# Patient Record
Sex: FEMALE | Race: WHITE | Employment: FULL TIME | URBAN - METROPOLITAN AREA
[De-identification: names, ages, dates, MRNs, and addresses within clinical notes are randomized per-mention and may not be internally consistent; named-entity substitution may affect disease eponyms.]

---

## 2018-11-28 ENCOUNTER — HOSPITAL ENCOUNTER (OUTPATIENT)
Age: 33
Discharge: HOME | DRG: 192 | End: 2018-11-28
Payer: COMMERCIAL

## 2018-11-28 ENCOUNTER — HOSPITAL ENCOUNTER (OUTPATIENT)
Dept: GENERAL RADIOLOGY | Age: 33
Discharge: HOME | DRG: 192 | End: 2018-11-28
Payer: COMMERCIAL

## 2018-11-28 DIAGNOSIS — R06.02 BREATH SHORTNESS: ICD-10-CM

## 2018-11-28 PROCEDURE — 71046 X-RAY EXAM CHEST 2 VIEWS: CPT

## 2018-11-30 ENCOUNTER — OFFICE VISIT (OUTPATIENT)
Dept: CARDIOLOGY CLINIC | Age: 33
End: 2018-11-30
Payer: COMMERCIAL

## 2018-11-30 ENCOUNTER — HOSPITAL ENCOUNTER (OUTPATIENT)
Dept: CARDIOLOGY | Age: 33
Discharge: HOME | DRG: 192 | End: 2018-11-30
Payer: COMMERCIAL

## 2018-11-30 ENCOUNTER — APPOINTMENT (OUTPATIENT)
Dept: GENERAL RADIOLOGY | Age: 33
DRG: 192 | End: 2018-11-30
Attending: INTERNAL MEDICINE
Payer: COMMERCIAL

## 2018-11-30 ENCOUNTER — HOSPITAL ENCOUNTER (INPATIENT)
Age: 33
LOS: 5 days | Discharge: HOME | DRG: 192 | End: 2018-12-05
Attending: INTERNAL MEDICINE | Admitting: FAMILY MEDICINE
Payer: COMMERCIAL

## 2018-11-30 ENCOUNTER — HOSPITAL ENCOUNTER (OUTPATIENT)
Age: 33
Discharge: HOME | DRG: 192 | End: 2018-11-30
Payer: COMMERCIAL

## 2018-11-30 VITALS
BODY MASS INDEX: 23.5 KG/M2 | DIASTOLIC BLOOD PRESSURE: 70 MMHG | WEIGHT: 132.6 LBS | HEART RATE: 123 BPM | SYSTOLIC BLOOD PRESSURE: 90 MMHG | OXYGEN SATURATION: 94 % | HEIGHT: 63 IN

## 2018-11-30 DIAGNOSIS — R06.02 SHORTNESS OF BREATH: ICD-10-CM

## 2018-11-30 DIAGNOSIS — R00.0 TACHYCARDIA: ICD-10-CM

## 2018-11-30 DIAGNOSIS — R93.89 ABNORMAL CHEST X-RAY: ICD-10-CM

## 2018-11-30 DIAGNOSIS — R93.89 ABNORMAL CHEST X-RAY: Primary | ICD-10-CM

## 2018-11-30 PROBLEM — I50.9 CONGESTIVE HEART FAILURE OF UNKNOWN ETIOLOGY (HCC): Status: ACTIVE | Noted: 2018-11-30

## 2018-11-30 LAB
A/G RATIO: 1.1 (ref 1.1–2.2)
ALBUMIN SERPL-MCNC: 2.6 G/DL (ref 3.4–5)
ALBUMIN SERPL-MCNC: 3.2 G/DL (ref 3.4–5)
ALP BLD-CCNC: 77 U/L (ref 40–129)
ALP BLD-CCNC: 87 U/L (ref 40–129)
ALT SERPL-CCNC: 181 U/L (ref 10–40)
ALT SERPL-CCNC: 201 U/L (ref 10–40)
AMPHETAMINE SCREEN, URINE: ABNORMAL
ANION GAP SERPL CALCULATED.3IONS-SCNC: 11 MMOL/L (ref 3–16)
ANION GAP SERPL CALCULATED.3IONS-SCNC: 14 MMOL/L (ref 3–16)
AST SERPL-CCNC: 114 U/L (ref 15–37)
AST SERPL-CCNC: 89 U/L (ref 15–37)
BARBITURATE SCREEN URINE: ABNORMAL
BASOPHILS ABSOLUTE: 0.1 K/UL (ref 0–0.2)
BASOPHILS ABSOLUTE: 0.1 K/UL (ref 0–0.2)
BASOPHILS RELATIVE PERCENT: 0.5 %
BASOPHILS RELATIVE PERCENT: 1 %
BENZODIAZEPINE SCREEN, URINE: ABNORMAL
BILIRUB SERPL-MCNC: 0.5 MG/DL (ref 0–1)
BILIRUB SERPL-MCNC: 0.5 MG/DL (ref 0–1)
BILIRUBIN DIRECT: <0.2 MG/DL (ref 0–0.3)
BILIRUBIN, INDIRECT: ABNORMAL MG/DL (ref 0–1)
BUN BLDV-MCNC: 10 MG/DL (ref 7–20)
BUN BLDV-MCNC: 9 MG/DL (ref 7–20)
C-REACTIVE PROTEIN: 17.7 MG/L (ref 0–5.1)
CALCIUM SERPL-MCNC: 8.5 MG/DL (ref 8.3–10.6)
CALCIUM SERPL-MCNC: 8.8 MG/DL (ref 8.3–10.6)
CANNABINOID SCREEN URINE: ABNORMAL
CHLORIDE BLD-SCNC: 102 MMOL/L (ref 99–110)
CHLORIDE BLD-SCNC: 107 MMOL/L (ref 99–110)
CO2: 20 MMOL/L (ref 21–32)
CO2: 21 MMOL/L (ref 21–32)
COCAINE METABOLITE SCREEN URINE: ABNORMAL
CREAT SERPL-MCNC: 0.7 MG/DL (ref 0.6–1.1)
CREAT SERPL-MCNC: 0.8 MG/DL (ref 0.6–1.1)
EKG ATRIAL RATE: 108 BPM
EKG DIAGNOSIS: NORMAL
EKG P AXIS: 76 DEGREES
EKG P-R INTERVAL: 136 MS
EKG Q-T INTERVAL: 368 MS
EKG QRS DURATION: 90 MS
EKG QTC CALCULATION (BAZETT): 493 MS
EKG R AXIS: 86 DEGREES
EKG T AXIS: 179 DEGREES
EKG VENTRICULAR RATE: 108 BPM
EOSINOPHILS ABSOLUTE: 0 K/UL (ref 0–0.6)
EOSINOPHILS ABSOLUTE: 0 K/UL (ref 0–0.6)
EOSINOPHILS RELATIVE PERCENT: 0.2 %
EOSINOPHILS RELATIVE PERCENT: 0.4 %
FERRITIN: 39.3 NG/ML (ref 15–150)
GFR AFRICAN AMERICAN: >60
GFR AFRICAN AMERICAN: >60
GFR NON-AFRICAN AMERICAN: >60
GFR NON-AFRICAN AMERICAN: >60
GLOBULIN: 3 G/DL
GLUCOSE BLD-MCNC: 116 MG/DL (ref 70–99)
GLUCOSE BLD-MCNC: 120 MG/DL (ref 70–99)
HCG(URINE) PREGNANCY TEST: NEGATIVE
HCT VFR BLD CALC: 44.1 % (ref 36–48)
HCT VFR BLD CALC: 45.3 % (ref 36–48)
HEMOGLOBIN: 14.5 G/DL (ref 12–16)
HEMOGLOBIN: 15.1 G/DL (ref 12–16)
IRON SATURATION: 11 % (ref 15–50)
IRON: 51 UG/DL (ref 37–145)
LV EF: 18 %
LVEF MODALITY: NORMAL
LYMPHOCYTES ABSOLUTE: 2.4 K/UL (ref 1–5.1)
LYMPHOCYTES ABSOLUTE: 2.8 K/UL (ref 1–5.1)
LYMPHOCYTES RELATIVE PERCENT: 23.1 %
LYMPHOCYTES RELATIVE PERCENT: 24.8 %
Lab: ABNORMAL
MAGNESIUM: 2 MG/DL (ref 1.8–2.4)
MCH RBC QN AUTO: 32.1 PG (ref 26–34)
MCH RBC QN AUTO: 32.5 PG (ref 26–34)
MCHC RBC AUTO-ENTMCNC: 32.8 G/DL (ref 31–36)
MCHC RBC AUTO-ENTMCNC: 33.4 G/DL (ref 31–36)
MCV RBC AUTO: 96.2 FL (ref 80–100)
MCV RBC AUTO: 98.9 FL (ref 80–100)
METHADONE SCREEN, URINE: POSITIVE
MONOCYTES ABSOLUTE: 0.6 K/UL (ref 0–1.3)
MONOCYTES ABSOLUTE: 0.8 K/UL (ref 0–1.3)
MONOCYTES RELATIVE PERCENT: 5.4 %
MONOCYTES RELATIVE PERCENT: 7.3 %
NEUTROPHILS ABSOLUTE: 7.2 K/UL (ref 1.7–7.7)
NEUTROPHILS ABSOLUTE: 7.7 K/UL (ref 1.7–7.7)
NEUTROPHILS RELATIVE PERCENT: 68.2 %
NEUTROPHILS RELATIVE PERCENT: 69.1 %
OPIATE SCREEN URINE: ABNORMAL
OXYCODONE URINE: ABNORMAL
PDW BLD-RTO: 12.8 % (ref 12.4–15.4)
PDW BLD-RTO: 12.8 % (ref 12.4–15.4)
PH UA: 5
PHENCYCLIDINE SCREEN URINE: ABNORMAL
PLATELET # BLD: 396 K/UL (ref 135–450)
PLATELET # BLD: 403 K/UL (ref 135–450)
PMV BLD AUTO: 8.2 FL (ref 5–10.5)
PMV BLD AUTO: 8.8 FL (ref 5–10.5)
POTASSIUM SERPL-SCNC: 4.4 MMOL/L (ref 3.5–5.1)
POTASSIUM SERPL-SCNC: 4.8 MMOL/L (ref 3.5–5.1)
PRO-BNP: 6438 PG/ML (ref 0–124)
PROPOXYPHENE SCREEN: ABNORMAL
RBC # BLD: 4.46 M/UL (ref 4–5.2)
RBC # BLD: 4.71 M/UL (ref 4–5.2)
SEDIMENTATION RATE, ERYTHROCYTE: 10 MM/HR (ref 0–20)
SODIUM BLD-SCNC: 134 MMOL/L (ref 136–145)
SODIUM BLD-SCNC: 141 MMOL/L (ref 136–145)
TOTAL CK: 56 U/L (ref 26–192)
TOTAL IRON BINDING CAPACITY: 453 UG/DL (ref 260–445)
TOTAL PROTEIN: 5.7 G/DL (ref 6.4–8.2)
TOTAL PROTEIN: 6.2 G/DL (ref 6.4–8.2)
TRICYCLIC, URINE: ABNORMAL
TROPONIN: <0.01 NG/ML
TSH REFLEX FT4: 2.79 UIU/ML (ref 0.27–4.2)
TSH REFLEX FT4: 3.24 UIU/ML (ref 0.27–4.2)
TSH REFLEX: 3.33 UIU/ML (ref 0.27–4.2)
WBC # BLD: 10.5 K/UL (ref 4–11)
WBC # BLD: 11.2 K/UL (ref 4–11)

## 2018-11-30 PROCEDURE — 84156 ASSAY OF PROTEIN URINE: CPT

## 2018-11-30 PROCEDURE — 93005 ELECTROCARDIOGRAM TRACING: CPT | Performed by: INTERNAL MEDICINE

## 2018-11-30 PROCEDURE — 85025 COMPLETE CBC W/AUTO DIFF WBC: CPT

## 2018-11-30 PROCEDURE — G8427 DOCREV CUR MEDS BY ELIG CLIN: HCPCS | Performed by: INTERNAL MEDICINE

## 2018-11-30 PROCEDURE — 86645 CMV ANTIBODY IGM: CPT

## 2018-11-30 PROCEDURE — 82550 ASSAY OF CK (CPK): CPT

## 2018-11-30 PROCEDURE — 87390 HIV-1 AG IA: CPT

## 2018-11-30 PROCEDURE — 83550 IRON BINDING TEST: CPT

## 2018-11-30 PROCEDURE — 84443 ASSAY THYROID STIM HORMONE: CPT

## 2018-11-30 PROCEDURE — 99244 OFF/OP CNSLTJ NEW/EST MOD 40: CPT | Performed by: INTERNAL MEDICINE

## 2018-11-30 PROCEDURE — 80307 DRUG TEST PRSMV CHEM ANLYZR: CPT

## 2018-11-30 PROCEDURE — G8420 CALC BMI NORM PARAMETERS: HCPCS | Performed by: INTERNAL MEDICINE

## 2018-11-30 PROCEDURE — G8484 FLU IMMUNIZE NO ADMIN: HCPCS | Performed by: INTERNAL MEDICINE

## 2018-11-30 PROCEDURE — 86701 HIV-1ANTIBODY: CPT

## 2018-11-30 PROCEDURE — 84484 ASSAY OF TROPONIN QUANT: CPT

## 2018-11-30 PROCEDURE — 86335 IMMUNFIX E-PHORSIS/URINE/CSF: CPT

## 2018-11-30 PROCEDURE — 86140 C-REACTIVE PROTEIN: CPT

## 2018-11-30 PROCEDURE — 6360000002 HC RX W HCPCS: Performed by: INTERNAL MEDICINE

## 2018-11-30 PROCEDURE — 80053 COMPREHEN METABOLIC PANEL: CPT

## 2018-11-30 PROCEDURE — 93000 ELECTROCARDIOGRAM COMPLETE: CPT | Performed by: INTERNAL MEDICINE

## 2018-11-30 PROCEDURE — 84703 CHORIONIC GONADOTROPIN ASSAY: CPT

## 2018-11-30 PROCEDURE — 86702 HIV-2 ANTIBODY: CPT

## 2018-11-30 PROCEDURE — 82728 ASSAY OF FERRITIN: CPT

## 2018-11-30 PROCEDURE — 6370000000 HC RX 637 (ALT 250 FOR IP): Performed by: INTERNAL MEDICINE

## 2018-11-30 PROCEDURE — 83880 ASSAY OF NATRIURETIC PEPTIDE: CPT

## 2018-11-30 PROCEDURE — 1200000000 HC SEMI PRIVATE

## 2018-11-30 PROCEDURE — 2580000003 HC RX 258: Performed by: INTERNAL MEDICINE

## 2018-11-30 PROCEDURE — 36415 COLL VENOUS BLD VENIPUNCTURE: CPT

## 2018-11-30 PROCEDURE — 93306 TTE W/DOPPLER COMPLETE: CPT

## 2018-11-30 PROCEDURE — 83540 ASSAY OF IRON: CPT

## 2018-11-30 PROCEDURE — 86038 ANTINUCLEAR ANTIBODIES: CPT

## 2018-11-30 PROCEDURE — 84166 PROTEIN E-PHORESIS/URINE/CSF: CPT

## 2018-11-30 PROCEDURE — 6370000000 HC RX 637 (ALT 250 FOR IP): Performed by: STUDENT IN AN ORGANIZED HEALTH CARE EDUCATION/TRAINING PROGRAM

## 2018-11-30 PROCEDURE — 85652 RBC SED RATE AUTOMATED: CPT

## 2018-11-30 PROCEDURE — 83735 ASSAY OF MAGNESIUM: CPT

## 2018-11-30 PROCEDURE — 71045 X-RAY EXAM CHEST 1 VIEW: CPT

## 2018-11-30 RX ORDER — AMOXICILLIN AND CLAVULANATE POTASSIUM 875; 125 MG/1; MG/1
1 TABLET, FILM COATED ORAL EVERY 12 HOURS SCHEDULED
Status: COMPLETED | OUTPATIENT
Start: 2018-11-30 | End: 2018-12-03

## 2018-11-30 RX ORDER — DIGOXIN 125 MCG
125 TABLET ORAL DAILY
Status: DISCONTINUED | OUTPATIENT
Start: 2018-11-30 | End: 2018-12-05 | Stop reason: HOSPADM

## 2018-11-30 RX ORDER — SODIUM CHLORIDE 0.9 % (FLUSH) 0.9 %
10 SYRINGE (ML) INJECTION EVERY 12 HOURS SCHEDULED
Status: DISCONTINUED | OUTPATIENT
Start: 2018-11-30 | End: 2018-12-05 | Stop reason: HOSPADM

## 2018-11-30 RX ORDER — FUROSEMIDE 40 MG/1
20 TABLET ORAL DAILY
Status: DISCONTINUED | OUTPATIENT
Start: 2018-11-30 | End: 2018-12-05 | Stop reason: HOSPADM

## 2018-11-30 RX ORDER — SPIRONOLACTONE 25 MG/1
25 TABLET ORAL DAILY
Status: DISCONTINUED | OUTPATIENT
Start: 2018-11-30 | End: 2018-12-05 | Stop reason: HOSPADM

## 2018-11-30 RX ORDER — LISINOPRIL 5 MG/1
5 TABLET ORAL DAILY
Status: DISCONTINUED | OUTPATIENT
Start: 2018-11-30 | End: 2018-12-05 | Stop reason: HOSPADM

## 2018-11-30 RX ORDER — ACETAMINOPHEN 500 MG
1000 TABLET ORAL EVERY 6 HOURS PRN
Status: DISCONTINUED | OUTPATIENT
Start: 2018-11-30 | End: 2018-12-05 | Stop reason: HOSPADM

## 2018-11-30 RX ORDER — AMOXICILLIN AND CLAVULANATE POTASSIUM 875; 125 MG/1; MG/1
1 TABLET, FILM COATED ORAL 2 TIMES DAILY
Status: ON HOLD | COMMUNITY
End: 2018-12-05 | Stop reason: HOSPADM

## 2018-11-30 RX ORDER — METHADONE HYDROCHLORIDE 10 MG/1
30 TABLET ORAL DAILY
Status: DISCONTINUED | OUTPATIENT
Start: 2018-12-01 | End: 2018-12-05 | Stop reason: HOSPADM

## 2018-11-30 RX ORDER — SODIUM CHLORIDE 0.9 % (FLUSH) 0.9 %
10 SYRINGE (ML) INJECTION PRN
Status: DISCONTINUED | OUTPATIENT
Start: 2018-11-30 | End: 2018-12-05 | Stop reason: HOSPADM

## 2018-11-30 RX ORDER — METOPROLOL SUCCINATE 25 MG/1
25 TABLET, EXTENDED RELEASE ORAL DAILY
Status: DISCONTINUED | OUTPATIENT
Start: 2018-12-01 | End: 2018-12-05 | Stop reason: HOSPADM

## 2018-11-30 RX ORDER — METOPROLOL SUCCINATE 25 MG/1
25 TABLET, EXTENDED RELEASE ORAL DAILY
Qty: 30 TABLET | Refills: 3 | Status: ON HOLD | OUTPATIENT
Start: 2018-11-30 | End: 2018-12-05 | Stop reason: HOSPADM

## 2018-11-30 RX ADMIN — ENOXAPARIN SODIUM 40 MG: 40 INJECTION SUBCUTANEOUS at 16:07

## 2018-11-30 RX ADMIN — LISINOPRIL 5 MG: 5 TABLET ORAL at 16:05

## 2018-11-30 RX ADMIN — FUROSEMIDE 20 MG: 40 TABLET ORAL at 16:05

## 2018-11-30 RX ADMIN — DIGOXIN 125 MCG: 125 TABLET ORAL at 16:05

## 2018-11-30 RX ADMIN — SPIRONOLACTONE 25 MG: 25 TABLET ORAL at 16:04

## 2018-11-30 RX ADMIN — ACETAMINOPHEN 1000 MG: 500 TABLET ORAL at 19:20

## 2018-11-30 RX ADMIN — Medication 10 ML: at 20:20

## 2018-11-30 RX ADMIN — AMOXICILLIN AND CLAVULANATE POTASSIUM 1 TABLET: 875; 125 TABLET, FILM COATED ORAL at 20:20

## 2018-11-30 ASSESSMENT — PAIN SCALES - GENERAL
PAINLEVEL_OUTOF10: 2
PAINLEVEL_OUTOF10: 0

## 2018-11-30 ASSESSMENT — PAIN DESCRIPTION - PAIN TYPE: TYPE: ACUTE PAIN

## 2018-11-30 ASSESSMENT — PAIN DESCRIPTION - LOCATION: LOCATION: MOUTH

## 2018-11-30 NOTE — PLAN OF CARE
Problem: HEMODYNAMIC STATUS  Goal: Patient has stable vital signs and fluid balance  Outcome: Ongoing  Patient's EF (Ejection Fraction) is less than 40% (15-20% from Echo 11/30/2018)    Patient has a past medical history of Anxiety; Bipolar 2 disorder ([facility] Congestive heart failure of unknown etiology ([facility] and Substance abuse ([facility] [facility] Comorbidities reviewed and education provided. Patient and family's stated goal of care: be more comfortable prior to discharge    Patient's current functional capacity:  Marked limitation of physical activity. Comfortable at rest. Less than ordinary activity causes fatigue, palpitation, or dyspnea. Pt resting in bed at this time on room air. Pt denies shortness of breath. Pt without lower extremity edema. Patient's weights and intake/output reviewed:    Patient Vitals for the past 96 hrs (Last 3 readings):   Weight   11/30/18 1321 131 lb 12.8 oz (59.8 kg)     No intake or output data in the 24 hours ending 11/30/18 1355    Patient provided with education on CHF signs/symptoms, causes, discharge medications, daily weights, low sodium diet, activity, and follow-up. Notified patient to call the doctor post discharge if patient experiences shortness of breath, chest pain, swelling, cough, or weight gain of three pounds in a day/five pounds in a week. Also notified patient to call the doctor with dizziness, increased fatigue, decreased or difficulty urinating. Pt verbalized understanding. No additional questions at this time.     Education Time: 10 Minutes

## 2018-11-30 NOTE — CARE COORDINATION
Spoke with patient and mother at bedside. She lives in a house with her mother and father. She is independent at home. She has been clean and sober for about 10 years according to her mother. Will continue to follow and assess for any potential needs.

## 2018-11-30 NOTE — PROGRESS NOTES
Echocardiogram to evaluate heart function and structure. 2. Start Torpol XL 25 mg once daily for heart rate control. 3. Finish your antibiotics. 4. Check TSH, Free T4, CBC and CMP non fasting. trop  5. Follow up with me 4 weeks.      [address] Nanine Closs, MD 11/30/2018 8:18 AM

## 2018-11-30 NOTE — FLOWSHEET NOTE
11/30/18 1418   Encounter Summary   Services provided to: Patient   Referral/Consult From: Nurse   Support System Parent   Continue Visiting (11-30, initial, just got bad news about heart.)   Complexity of Encounter Moderate   Length of Encounter 15 minutes   Spiritual Assessment Completed Yes   Routine   Type Initial   Assessment Calm; Approachable   Intervention Active listening;Explored feelings, thoughts, concerns;Nurtured hope;Discussed illness/injury and it's impact   Outcome Engaged in conversation;Receptive    visit to assess needs and offer support. Patient this is all new to her. Her mother is supportive. Offered ongoing prn follow-up for support.

## 2018-11-30 NOTE — CONSULTS
11/30/2018    CREATININE 0.7 11/30/2018    GFRAA >60 11/30/2018    GFRAA >60 05/12/2010    LABGLOM >60 11/30/2018    GLUCOSE 116 11/30/2018    PROT 5.7 11/30/2018    PROT 7.5 05/12/2010    CALCIUM 8.5 11/30/2018    BILITOT 0.5 11/30/2018    ALKPHOS 77 11/30/2018    AST 89 11/30/2018     11/30/2018     PT/INR:  No results found for: PTINR  Lab Results   Component Value Date    TROPONINI <0.01 11/30/2018       EKG:  I have reviewed EKG with the following interpretation:  Impression: 11.30.18 sinus tachycardia anterolateral T wave inversion. Assessment  Acute systolic CHF  Underlying cardiomyopathy type undetermined rule out myocarditis    Patient Active Problem List   Diagnosis    Substance abuse ([facility]    Bipolar 2 disorder ([facility]    Anxiety    Abnormal chest x-ray    Shortness of breath    Tachycardia    Congestive heart failure of unknown etiology ([facility] [facility]         Plan:    I had the opportunity to review the clinical symptoms and presentation of Cortney Jurado. I recommend that the patient undergo work up for cardiomyopathy, rule out CAD less likely cause. BRENDON thyroid Iron deposition disease  She will undergo Rt and Lt heart cath on Monday  Urine light chains  Start low dose ace as tolerated   Toprol XL 25 mg daily  Lasix low dose  Digoxin  Aldactone   Low salt diet  Further treatment will depend on results of cath and response to above treatment    I will address the patient's cardiac risk factors and adjusted pharmacologic treatment as needed. In addition, I have reinforced the need for patient directed risk factor modification. Tobacco use was discussed with the patient and educated on the negative effects. I have asked the patient to not utilize these agents. Thank you for allowing to us to participate in the care or Cortney Jurado. Further evaluation will be based upon the patient's clinical course and testing results.     All questions and concerns were addressed to the

## 2018-11-30 NOTE — H&P
abuse admitted on 11/30 for CHF of unknown etiology. PLAN:    -- CHF of unknown etiology - Having exertional SOB and intermittent cough. Recent CXR on 11/28 suggestive of CHF. Echo today with EF of 16%. Troponin <0.01. Pro-BNP 6,438. Stable on RA. Asymptomatic currently. - Cardiology consulted. Appreciate their recommendations.    - TSH/T4 ordered    - HIV, CMV IgM ordered to rule out viral cardiomyopathy    - BRENDON ordered    - Will start Lisinopril 5mg daily    - Because of initial tachycardia, will hold off on beta blocker at this time. Received Toprol 25mg this AM.     -- Transaminitis - , ALT 89. Pt with remote hx of IV drug use. No abdominal pain or jaundice noted. - May consider ordering hepatitis panel tomorrow    -- Hx of polysubstance abuse - remote history of IV heroin use about 10 years ago. Pt states that she has not used IV since. Transaminitis noted , ALT 89 on admission.    - UDS pending   - Methadone 30mg daily ordered (starting tomorrow)    -- Recent tooth extraction - WBC 11.2 on admission.    - Continue Augmentin BID (On day 2 of 5)     DVT Prophylaxis: Lovenox    Diet: DIET LOW SODIUM 2 GM;    Code Status: Full Code    PT/OT Eval Status: N/A    Dispo - 2-3 days inpatient     This patient was seen and evaluated with attending, Dr. Kari Sauer. Mirta Mcneil D.O.    [place] [id number]. Service  PGY-1

## 2018-12-01 ENCOUNTER — APPOINTMENT (OUTPATIENT)
Dept: GENERAL RADIOLOGY | Age: 33
DRG: 192 | End: 2018-12-01
Attending: INTERNAL MEDICINE
Payer: COMMERCIAL

## 2018-12-01 LAB
ANION GAP SERPL CALCULATED.3IONS-SCNC: 12 MMOL/L (ref 3–16)
BASOPHILS ABSOLUTE: 0.1 K/UL (ref 0–0.2)
BASOPHILS RELATIVE PERCENT: 0.7 %
BUN BLDV-MCNC: 11 MG/DL (ref 7–20)
CALCIUM SERPL-MCNC: 8.3 MG/DL (ref 8.3–10.6)
CHLORIDE BLD-SCNC: 103 MMOL/L (ref 99–110)
CO2: 20 MMOL/L (ref 21–32)
CREAT SERPL-MCNC: 0.7 MG/DL (ref 0.6–1.1)
EOSINOPHILS ABSOLUTE: 0 K/UL (ref 0–0.6)
EOSINOPHILS RELATIVE PERCENT: 0.3 %
GFR AFRICAN AMERICAN: >60
GFR NON-AFRICAN AMERICAN: >60
GLUCOSE BLD-MCNC: 88 MG/DL (ref 70–99)
HAV IGM SER IA-ACNC: NORMAL
HCT VFR BLD CALC: 44.4 % (ref 36–48)
HEMOGLOBIN: 15.1 G/DL (ref 12–16)
HEPATITIS B CORE IGM ANTIBODY: NORMAL
HEPATITIS B SURFACE ANTIGEN INTERPRETATION: NORMAL
HEPATITIS C ANTIBODY INTERPRETATION: NORMAL
HIV AG/AB: NORMAL
HIV ANTIGEN: NORMAL
HIV-1 ANTIBODY: NORMAL
HIV-2 AB: NORMAL
LYMPHOCYTES ABSOLUTE: 3 K/UL (ref 1–5.1)
LYMPHOCYTES RELATIVE PERCENT: 33.1 %
MCH RBC QN AUTO: 32.3 PG (ref 26–34)
MCHC RBC AUTO-ENTMCNC: 33.9 G/DL (ref 31–36)
MCV RBC AUTO: 95.2 FL (ref 80–100)
MONOCYTES ABSOLUTE: 0.7 K/UL (ref 0–1.3)
MONOCYTES RELATIVE PERCENT: 7.5 %
NEUTROPHILS ABSOLUTE: 5.3 K/UL (ref 1.7–7.7)
NEUTROPHILS RELATIVE PERCENT: 58.4 %
PDW BLD-RTO: 12.6 % (ref 12.4–15.4)
PLATELET # BLD: 375 K/UL (ref 135–450)
PMV BLD AUTO: 9.4 FL (ref 5–10.5)
POTASSIUM SERPL-SCNC: 4.3 MMOL/L (ref 3.5–5.1)
PROTEIN PROTEIN: 0.01 G/DL
PROTEIN PROTEIN: 10 MG/DL
RBC # BLD: 4.66 M/UL (ref 4–5.2)
SODIUM BLD-SCNC: 135 MMOL/L (ref 136–145)
WBC # BLD: 9 K/UL (ref 4–11)

## 2018-12-01 PROCEDURE — 99233 SBSQ HOSP IP/OBS HIGH 50: CPT | Performed by: NURSE PRACTITIONER

## 2018-12-01 PROCEDURE — 94761 N-INVAS EAR/PLS OXIMETRY MLT: CPT

## 2018-12-01 PROCEDURE — 85025 COMPLETE CBC W/AUTO DIFF WBC: CPT

## 2018-12-01 PROCEDURE — 6370000000 HC RX 637 (ALT 250 FOR IP): Performed by: INTERNAL MEDICINE

## 2018-12-01 PROCEDURE — 80074 ACUTE HEPATITIS PANEL: CPT

## 2018-12-01 PROCEDURE — 36415 COLL VENOUS BLD VENIPUNCTURE: CPT

## 2018-12-01 PROCEDURE — 80048 BASIC METABOLIC PNL TOTAL CA: CPT

## 2018-12-01 PROCEDURE — 71045 X-RAY EXAM CHEST 1 VIEW: CPT

## 2018-12-01 PROCEDURE — 1200000000 HC SEMI PRIVATE

## 2018-12-01 PROCEDURE — 2580000003 HC RX 258: Performed by: INTERNAL MEDICINE

## 2018-12-01 PROCEDURE — 6370000000 HC RX 637 (ALT 250 FOR IP): Performed by: STUDENT IN AN ORGANIZED HEALTH CARE EDUCATION/TRAINING PROGRAM

## 2018-12-01 PROCEDURE — 2700000000 HC OXYGEN THERAPY PER DAY

## 2018-12-01 RX ADMIN — FUROSEMIDE 20 MG: 40 TABLET ORAL at 08:56

## 2018-12-01 RX ADMIN — AMOXICILLIN AND CLAVULANATE POTASSIUM 1 TABLET: 875; 125 TABLET, FILM COATED ORAL at 21:37

## 2018-12-01 RX ADMIN — Medication 10 ML: at 08:58

## 2018-12-01 RX ADMIN — Medication 10 ML: at 21:37

## 2018-12-01 RX ADMIN — SPIRONOLACTONE 25 MG: 25 TABLET ORAL at 08:55

## 2018-12-01 RX ADMIN — METOPROLOL SUCCINATE 25 MG: 25 TABLET, EXTENDED RELEASE ORAL at 08:56

## 2018-12-01 RX ADMIN — AMOXICILLIN AND CLAVULANATE POTASSIUM 1 TABLET: 875; 125 TABLET, FILM COATED ORAL at 08:55

## 2018-12-01 RX ADMIN — ACETAMINOPHEN 1000 MG: 500 TABLET ORAL at 16:32

## 2018-12-01 RX ADMIN — METHADONE HYDROCHLORIDE 30 MG: 10 TABLET ORAL at 08:55

## 2018-12-01 RX ADMIN — LISINOPRIL 5 MG: 5 TABLET ORAL at 08:55

## 2018-12-01 RX ADMIN — ACETAMINOPHEN 1000 MG: 500 TABLET ORAL at 06:26

## 2018-12-01 RX ADMIN — DIGOXIN 125 MCG: 125 TABLET ORAL at 08:55

## 2018-12-01 RX ADMIN — ACETAMINOPHEN 1000 MG: 500 TABLET ORAL at 21:40

## 2018-12-01 ASSESSMENT — PAIN SCALES - GENERAL
PAINLEVEL_OUTOF10: 1
PAINLEVEL_OUTOF10: 4
PAINLEVEL_OUTOF10: 0
PAINLEVEL_OUTOF10: 4
PAINLEVEL_OUTOF10: 0
PAINLEVEL_OUTOF10: 4

## 2018-12-01 NOTE — PROGRESS NOTES
Inpatient Family Medicine Progress Note      PCP: Alverto Lugo MD    Date of Admission: 11/30/2018    Chief Complaint: SOB and cough    Subjective:     Pt was seen and examined by me today. She's doing well and looks well in normal exam. Denies Chest pain or SOB while staying in bed however, States that SOB gets worse when she starts walking. Patient does have a remote history of IV heroin use about 10 years ago. Was also using Percocet off the street about 4 years ago. Has been stable on Methadone 30mg daily through [facility]      Medications:  Reviewed    Infusion Medications   Scheduled Medications    sodium chloride flush  10 mL Intravenous 2 times per day    enoxaparin  40 mg Subcutaneous Daily    amoxicillin-clavulanate  1 tablet Oral 2 times per day    methadone  30 mg Oral Daily    lisinopril  5 mg Oral Daily    furosemide  20 mg Oral Daily    spironolactone  25 mg Oral Daily    digoxin  125 mcg Oral Daily    metoprolol succinate  25 mg Oral Daily     PRN Meds: sodium chloride flush, magnesium hydroxide, acetaminophen      Intake/Output Summary (Last 24 hours) at 12/01/18 1631  Last data filed at 12/01/18 1149   Gross per 24 hour   Intake              840 ml   Output             1525 ml   Net             -685 ml       Physical Exam Performed:    /65   Pulse 96   Temp 98 °F (36.7 °C) (Oral)   Resp 15   Ht 5' 3\" (1.6 m)   Wt 130 lb 12.8 oz (59.3 kg)   LMP 11/22/2018 (Exact Date)   SpO2 97%   Breastfeeding? No   BMI 23.17 kg/m²     General appearance:  No apparent distress, appears stated age and cooperative. HEENT:  Normal cephalic, atraumatic without obvious deformity. Pupils equal, round, and reactive to light. Extra ocular muscles intact. Conjunctivae/corneas clear. Neck: Supple, with full range of motion. No jugular venous distention. Trachea midline. Respiratory:  Normal respiratory effort.  Clear to auscultation, bilaterally without

## 2018-12-01 NOTE — PROGRESS NOTES
(PRINIVIL;ZESTRIL) tablet 5 mg  5 mg Oral Daily Gracia [place], DO   5 mg at 12/01/18 0855    furosemide (LASIX) tablet 20 mg  20 mg Oral Daily Natalie Libman, MD   20 mg at 12/01/18 0545    spironolactone (ALDACTONE) tablet 25 mg  25 mg Oral Daily Natalie Libman, MD   25 mg at 12/01/18 0855    digoxin (LANOXIN) tablet 125 mcg  125 mcg Oral Daily Natalie Libman, MD   125 mcg at 12/01/18 4127    metoprolol succinate (TOPROL XL) extended release tablet 25 mg  25 mg Oral Daily Natalie Libman, MD   25 mg at 12/01/18 3679    acetaminophen (TYLENOL) tablet 1,000 mg  1,000 mg Oral Q6H PRN Gracia Eagle, DO   1,000 mg at 12/01/18 1341       Objective:     Telemetry monitor: SR    Physical Exam:  Constitutional:  Comfortable, NAD  Skin:  Warm and dry; no rash or lesions  Neck:  Supple, no JVD  Heart:  Regular, normal apex, S1 and S2 normal  Lungs:  Clear; no wheezing/rhonchi/rales  Abdomen: soft, non tender, + bowel sounds  Extremities:  No edema or cyanosis  Neuro: alert and oriented, moves all extremities equally    Data  EKG 11/30/2018:   Sinus tach     Echo  11/30/2018: The left ventricular systolic function is severely reduced with an ejection fraction of 15-20%.   EF by Kay's method estimated at 16%.   Upper limits of normal left ventricle size.   Severe global hypokinesis. Paradoxical septal motion. No intracardiac thrombus.   Elevated left ventricular diastolic filling pressure.   The right ventricle is normal in size with decreased function.   The left atrium is moderately dilated.   The right atrium is mildly dilated.   Color flow demonstrates eccentric jet suggesting moderate mitral regurgitation.   Mild pulmonic regurgitation.   Moderate tricuspid regurgitation.   Systolic pulmonary artery pressure (SPAP) is normal and estimated at 29 mmHg   (right atrial pressure 8 mmHg).     Lab Review     Renal Profile: Lab Results   Component Value Date    CREATININE 0.7 12/01/2018    BUN 11 12/01/2018    NA 135 12/01/2018    K 4.3 12/01/2018     12/01/2018    CO2 20 12/01/2018     CBC:  Lab Results   Component Value Date    WBC 9.0 12/01/2018    RBC 4.66 12/01/2018    HGB 15.1 12/01/2018    HCT 44.4 12/01/2018    MCV 95.2 12/01/2018    RDW 12.6 12/01/2018     12/01/2018     BNP:  No results found for: BNP  Fasting Lipid Panel:  No results found for: CHOL, HDL, TRIG  Cardiac Enzymes:  CK/MbTroponin  Lab Results   Component Value Date    CKTOTAL 56 11/30/2018    TROPONINI <0.01 11/30/2018     PT/ INR No results found for: INR, PROTIME  PTT No results found for: PTT Lab Results   Component Value Date    MG 2.00 11/30/2018    No results found for: TSH    Assessment:  1. Cardiomyopathy: etiology unknown  2. Acute systolic CHF: improving   -admission weight 131 pounds, is 130 today    -I&O appears inaccurate  3. NSVT: 2 brief episodes noted on telemetry  4. Sinus tachycardia: improved  5. Mitral and tricuspid regurgitation: moderate on echo  6. History of IVDA: on methadone (no reported IVDA in 10 years)  7. ADHD: on Adderall  8. Recent tooth extraction: on Augmentin  9. Transaminitis    Plan:   1. Continue Toprol, lisinopril, digoxin, Lasix, and spironolactone  2. Will increase Toprol and lisinopril as BP and HR allow  3. Daily weight, strict I&O, fluid and sodium restriction  4. LHC (and possible RHC) planned for 12/3. NPO after MN before procedure. Orders are placed. 5. Echo for EF 90 days after medications are optimized. Will recommend ICD if EF is 35% or less. 6. BMP in am  7.  Multiple labs still pending per primary however HIV is negative     DANIEL Kay-CNP  [facility]  [phone]

## 2018-12-02 ENCOUNTER — APPOINTMENT (OUTPATIENT)
Dept: GENERAL RADIOLOGY | Age: 33
DRG: 192 | End: 2018-12-02
Attending: INTERNAL MEDICINE
Payer: COMMERCIAL

## 2018-12-02 LAB
ANION GAP SERPL CALCULATED.3IONS-SCNC: 10 MMOL/L (ref 3–16)
BASOPHILS ABSOLUTE: 0.1 K/UL (ref 0–0.2)
BASOPHILS RELATIVE PERCENT: 0.7 %
BUN BLDV-MCNC: 11 MG/DL (ref 7–20)
CALCIUM SERPL-MCNC: 7.9 MG/DL (ref 8.3–10.6)
CHLORIDE BLD-SCNC: 105 MMOL/L (ref 99–110)
CO2: 22 MMOL/L (ref 21–32)
CREAT SERPL-MCNC: 0.8 MG/DL (ref 0.6–1.1)
CYTOMEGALOVIRUS IGM ANTIBODY: <8 AU/ML
EOSINOPHILS ABSOLUTE: 0 K/UL (ref 0–0.6)
EOSINOPHILS RELATIVE PERCENT: 0.5 %
GFR AFRICAN AMERICAN: >60
GFR NON-AFRICAN AMERICAN: >60
GLUCOSE BLD-MCNC: 116 MG/DL (ref 70–99)
HCT VFR BLD CALC: 40 % (ref 36–48)
HEMOGLOBIN: 13.6 G/DL (ref 12–16)
LYMPHOCYTES ABSOLUTE: 2.7 K/UL (ref 1–5.1)
LYMPHOCYTES RELATIVE PERCENT: 38.2 %
MAGNESIUM: 1.8 MG/DL (ref 1.8–2.4)
MCH RBC QN AUTO: 32.3 PG (ref 26–34)
MCHC RBC AUTO-ENTMCNC: 34 G/DL (ref 31–36)
MCV RBC AUTO: 94.9 FL (ref 80–100)
MONOCYTES ABSOLUTE: 0.5 K/UL (ref 0–1.3)
MONOCYTES RELATIVE PERCENT: 7 %
NEUTROPHILS ABSOLUTE: 3.8 K/UL (ref 1.7–7.7)
NEUTROPHILS RELATIVE PERCENT: 53.6 %
PDW BLD-RTO: 12.4 % (ref 12.4–15.4)
PLATELET # BLD: 342 K/UL (ref 135–450)
PMV BLD AUTO: 7.9 FL (ref 5–10.5)
POTASSIUM SERPL-SCNC: 3.7 MMOL/L (ref 3.5–5.1)
RBC # BLD: 4.22 M/UL (ref 4–5.2)
SODIUM BLD-SCNC: 137 MMOL/L (ref 136–145)
WBC # BLD: 7.2 K/UL (ref 4–11)

## 2018-12-02 PROCEDURE — 1200000000 HC SEMI PRIVATE

## 2018-12-02 PROCEDURE — 85025 COMPLETE CBC W/AUTO DIFF WBC: CPT

## 2018-12-02 PROCEDURE — 6370000000 HC RX 637 (ALT 250 FOR IP): Performed by: STUDENT IN AN ORGANIZED HEALTH CARE EDUCATION/TRAINING PROGRAM

## 2018-12-02 PROCEDURE — 82164 ANGIOTENSIN I ENZYME TEST: CPT

## 2018-12-02 PROCEDURE — 6370000000 HC RX 637 (ALT 250 FOR IP): Performed by: INTERNAL MEDICINE

## 2018-12-02 PROCEDURE — 6360000002 HC RX W HCPCS: Performed by: INTERNAL MEDICINE

## 2018-12-02 PROCEDURE — 99233 SBSQ HOSP IP/OBS HIGH 50: CPT | Performed by: INTERNAL MEDICINE

## 2018-12-02 PROCEDURE — 80048 BASIC METABOLIC PNL TOTAL CA: CPT

## 2018-12-02 PROCEDURE — 83735 ASSAY OF MAGNESIUM: CPT

## 2018-12-02 PROCEDURE — 36415 COLL VENOUS BLD VENIPUNCTURE: CPT

## 2018-12-02 PROCEDURE — 71045 X-RAY EXAM CHEST 1 VIEW: CPT

## 2018-12-02 PROCEDURE — 2580000003 HC RX 258: Performed by: INTERNAL MEDICINE

## 2018-12-02 RX ORDER — MAGNESIUM SULFATE HEPTAHYDRATE 500 MG/ML
2 INJECTION, SOLUTION INTRAMUSCULAR; INTRAVENOUS ONCE
Status: DISCONTINUED | OUTPATIENT
Start: 2018-12-02 | End: 2018-12-02 | Stop reason: SDUPTHER

## 2018-12-02 RX ORDER — MAGNESIUM SULFATE IN WATER 40 MG/ML
2 INJECTION, SOLUTION INTRAVENOUS ONCE
Status: COMPLETED | OUTPATIENT
Start: 2018-12-02 | End: 2018-12-02

## 2018-12-02 RX ORDER — POTASSIUM CHLORIDE 20 MEQ/1
40 TABLET, EXTENDED RELEASE ORAL ONCE
Status: COMPLETED | OUTPATIENT
Start: 2018-12-02 | End: 2018-12-02

## 2018-12-02 RX ADMIN — AMOXICILLIN AND CLAVULANATE POTASSIUM 1 TABLET: 875; 125 TABLET, FILM COATED ORAL at 19:52

## 2018-12-02 RX ADMIN — Medication 10 ML: at 19:52

## 2018-12-02 RX ADMIN — AMOXICILLIN AND CLAVULANATE POTASSIUM 1 TABLET: 875; 125 TABLET, FILM COATED ORAL at 08:35

## 2018-12-02 RX ADMIN — MAGNESIUM SULFATE HEPTAHYDRATE 2 G: 40 INJECTION, SOLUTION INTRAVENOUS at 12:45

## 2018-12-02 RX ADMIN — ACETAMINOPHEN 1000 MG: 500 TABLET ORAL at 16:11

## 2018-12-02 RX ADMIN — Medication 10 ML: at 08:37

## 2018-12-02 RX ADMIN — METOPROLOL SUCCINATE 25 MG: 25 TABLET, EXTENDED RELEASE ORAL at 08:35

## 2018-12-02 RX ADMIN — DIGOXIN 125 MCG: 125 TABLET ORAL at 08:35

## 2018-12-02 RX ADMIN — SPIRONOLACTONE 25 MG: 25 TABLET ORAL at 08:35

## 2018-12-02 RX ADMIN — ACETAMINOPHEN 1000 MG: 500 TABLET ORAL at 08:36

## 2018-12-02 RX ADMIN — FUROSEMIDE 20 MG: 40 TABLET ORAL at 08:35

## 2018-12-02 RX ADMIN — ACETAMINOPHEN 1000 MG: 500 TABLET ORAL at 23:30

## 2018-12-02 RX ADMIN — LISINOPRIL 5 MG: 5 TABLET ORAL at 08:35

## 2018-12-02 RX ADMIN — POTASSIUM CHLORIDE 40 MEQ: 20 TABLET, EXTENDED RELEASE ORAL at 12:44

## 2018-12-02 RX ADMIN — METHADONE HYDROCHLORIDE 30 MG: 10 TABLET ORAL at 08:35

## 2018-12-02 ASSESSMENT — PAIN SCALES - GENERAL
PAINLEVEL_OUTOF10: 3
PAINLEVEL_OUTOF10: 4
PAINLEVEL_OUTOF10: 3
PAINLEVEL_OUTOF10: 4
PAINLEVEL_OUTOF10: 5

## 2018-12-02 ASSESSMENT — PAIN DESCRIPTION - PROGRESSION
CLINICAL_PROGRESSION: NOT CHANGED
CLINICAL_PROGRESSION: NOT CHANGED

## 2018-12-02 ASSESSMENT — PAIN DESCRIPTION - FREQUENCY
FREQUENCY: CONTINUOUS
FREQUENCY: CONTINUOUS

## 2018-12-02 ASSESSMENT — PAIN DESCRIPTION - PAIN TYPE
TYPE: ACUTE PAIN

## 2018-12-02 ASSESSMENT — PAIN DESCRIPTION - ORIENTATION
ORIENTATION: LEFT;UPPER
ORIENTATION: LEFT;UPPER

## 2018-12-02 ASSESSMENT — PAIN DESCRIPTION - DESCRIPTORS
DESCRIPTORS: ACHING;NAGGING
DESCRIPTORS: ACHING;NAGGING

## 2018-12-02 ASSESSMENT — PAIN DESCRIPTION - ONSET
ONSET: ON-GOING
ONSET: ON-GOING

## 2018-12-02 ASSESSMENT — PAIN DESCRIPTION - LOCATION
LOCATION: TEETH

## 2018-12-02 NOTE — PROGRESS NOTES
Inpatient Family Medicine Progress Note      PCP: Jb Gomez MD    Date of Admission: 11/30/2018    Chief Complaint: Cough, Dyspnea     Hospital Course: Patient was seen by cardiology on 11/30 after having 3-4 weeks of worsening dypnea. Echo was performed in the office which showed an EF of 16-20%, and was subsequently admitted to the hospital.  EKG showed ST changes suspicious for possible lateral ischemia. Subjective: Feeling much less short of breath overnight. Was able to walk to the bathroom and shower without becoming short of breath. Patient spoke to cardiology and is awaiting her right and left heart cath to be performed tomorrow. Medications:  Reviewed    Infusion Medications   Scheduled Medications    magnesium sulfate  2 g Intravenous Once    sodium chloride flush  10 mL Intravenous 2 times per day    enoxaparin  40 mg Subcutaneous Daily    amoxicillin-clavulanate  1 tablet Oral 2 times per day    methadone  30 mg Oral Daily    lisinopril  5 mg Oral Daily    furosemide  20 mg Oral Daily    spironolactone  25 mg Oral Daily    digoxin  125 mcg Oral Daily    metoprolol succinate  25 mg Oral Daily     PRN Meds: sodium chloride flush, magnesium hydroxide, acetaminophen      Intake/Output Summary (Last 24 hours) at 12/02/18 1353  Last data filed at 12/02/18 1204   Gross per 24 hour   Intake             1680 ml   Output              900 ml   Net              780 ml       Physical Exam Performed:    /64   Pulse 70   Temp 97.9 °F (36.6 °C) (Oral)   Resp 16   Ht 5' 3\" (1.6 m)   Wt 131 lb (59.4 kg)   LMP 11/22/2018 (Exact Date)   SpO2 96%   Breastfeeding? No   BMI 23.21 kg/m²     General appearance: No apparent distress, appears stated age and cooperative. HEENT: Pupils equal, round, and reactive to light. Conjunctivae/corneas clear. Neck: Supple, with full range of motion. No jugular venous distention. Trachea midline. Respiratory:  Normal respiratory effort.  Clear to

## 2018-12-03 ENCOUNTER — APPOINTMENT (OUTPATIENT)
Dept: CARDIAC CATH/INVASIVE PROCEDURES | Age: 33
DRG: 192 | End: 2018-12-03
Attending: INTERNAL MEDICINE
Payer: COMMERCIAL

## 2018-12-03 LAB
ANA INTERPRETATION: NORMAL
ANION GAP SERPL CALCULATED.3IONS-SCNC: 9 MMOL/L (ref 3–16)
ANTI-NUCLEAR ANTIBODY (ANA): NEGATIVE
BASOPHILS ABSOLUTE: 0.1 K/UL (ref 0–0.2)
BASOPHILS RELATIVE PERCENT: 0.8 %
BUN BLDV-MCNC: 9 MG/DL (ref 7–20)
CALCIUM SERPL-MCNC: 8.1 MG/DL (ref 8.3–10.6)
CHLORIDE BLD-SCNC: 107 MMOL/L (ref 99–110)
CO2: 24 MMOL/L (ref 21–32)
CREAT SERPL-MCNC: 0.7 MG/DL (ref 0.6–1.1)
EOSINOPHILS ABSOLUTE: 0.1 K/UL (ref 0–0.6)
EOSINOPHILS RELATIVE PERCENT: 0.8 %
GFR AFRICAN AMERICAN: >60
GFR NON-AFRICAN AMERICAN: >60
GLUCOSE BLD-MCNC: 91 MG/DL (ref 70–99)
HCT VFR BLD CALC: 40.3 % (ref 36–48)
HEMOGLOBIN: 13.6 G/DL (ref 12–16)
LYMPHOCYTES ABSOLUTE: 3 K/UL (ref 1–5.1)
LYMPHOCYTES RELATIVE PERCENT: 40.4 %
MCH RBC QN AUTO: 32.2 PG (ref 26–34)
MCHC RBC AUTO-ENTMCNC: 33.7 G/DL (ref 31–36)
MCV RBC AUTO: 95.7 FL (ref 80–100)
MONOCYTES ABSOLUTE: 0.6 K/UL (ref 0–1.3)
MONOCYTES RELATIVE PERCENT: 8 %
NEUTROPHILS ABSOLUTE: 3.8 K/UL (ref 1.7–7.7)
NEUTROPHILS RELATIVE PERCENT: 50 %
PDW BLD-RTO: 12.6 % (ref 12.4–15.4)
PLATELET # BLD: 302 K/UL (ref 135–450)
PMV BLD AUTO: 8.3 FL (ref 5–10.5)
POTASSIUM SERPL-SCNC: 4.6 MMOL/L (ref 3.5–5.1)
RBC # BLD: 4.21 M/UL (ref 4–5.2)
SODIUM BLD-SCNC: 140 MMOL/L (ref 136–145)
WBC # BLD: 7.5 K/UL (ref 4–11)

## 2018-12-03 PROCEDURE — 6370000000 HC RX 637 (ALT 250 FOR IP): Performed by: STUDENT IN AN ORGANIZED HEALTH CARE EDUCATION/TRAINING PROGRAM

## 2018-12-03 PROCEDURE — 4A023N8 MEASUREMENT OF CARDIAC SAMPLING AND PRESSURE, BILATERAL, PERCUTANEOUS APPROACH: ICD-10-PCS | Performed by: INTERNAL MEDICINE

## 2018-12-03 PROCEDURE — 2709999900 HC NON-CHARGEABLE SUPPLY

## 2018-12-03 PROCEDURE — 93460 R&L HRT ART/VENTRICLE ANGIO: CPT | Performed by: INTERNAL MEDICINE

## 2018-12-03 PROCEDURE — 2580000003 HC RX 258: Performed by: INTERNAL MEDICINE

## 2018-12-03 PROCEDURE — B2111ZZ FLUOROSCOPY OF MULTIPLE CORONARY ARTERIES USING LOW OSMOLAR CONTRAST: ICD-10-PCS | Performed by: INTERNAL MEDICINE

## 2018-12-03 PROCEDURE — C1769 GUIDE WIRE: HCPCS

## 2018-12-03 PROCEDURE — 36415 COLL VENOUS BLD VENIPUNCTURE: CPT

## 2018-12-03 PROCEDURE — 93566 NJX CAR CTH SLCTV RV/RA ANG: CPT | Performed by: INTERNAL MEDICINE

## 2018-12-03 PROCEDURE — 6370000000 HC RX 637 (ALT 250 FOR IP): Performed by: INTERNAL MEDICINE

## 2018-12-03 PROCEDURE — 80048 BASIC METABOLIC PNL TOTAL CA: CPT

## 2018-12-03 PROCEDURE — C1887 CATHETER, GUIDING: HCPCS

## 2018-12-03 PROCEDURE — 99024 POSTOP FOLLOW-UP VISIT: CPT | Performed by: INTERNAL MEDICINE

## 2018-12-03 PROCEDURE — 1200000000 HC SEMI PRIVATE

## 2018-12-03 PROCEDURE — 85025 COMPLETE CBC W/AUTO DIFF WBC: CPT

## 2018-12-03 PROCEDURE — 6360000004 HC RX CONTRAST MEDICATION: Performed by: INTERNAL MEDICINE

## 2018-12-03 PROCEDURE — C1894 INTRO/SHEATH, NON-LASER: HCPCS

## 2018-12-03 PROCEDURE — B2151ZZ FLUOROSCOPY OF LEFT HEART USING LOW OSMOLAR CONTRAST: ICD-10-PCS | Performed by: INTERNAL MEDICINE

## 2018-12-03 PROCEDURE — 2500000003 HC RX 250 WO HCPCS

## 2018-12-03 PROCEDURE — 2580000003 HC RX 258

## 2018-12-03 PROCEDURE — 6360000002 HC RX W HCPCS

## 2018-12-03 PROCEDURE — 99152 MOD SED SAME PHYS/QHP 5/>YRS: CPT | Performed by: INTERNAL MEDICINE

## 2018-12-03 PROCEDURE — 99153 MOD SED SAME PHYS/QHP EA: CPT | Performed by: INTERNAL MEDICINE

## 2018-12-03 RX ADMIN — AMOXICILLIN AND CLAVULANATE POTASSIUM 1 TABLET: 875; 125 TABLET, FILM COATED ORAL at 08:11

## 2018-12-03 RX ADMIN — Medication 10 ML: at 08:11

## 2018-12-03 RX ADMIN — METOPROLOL SUCCINATE 25 MG: 25 TABLET, EXTENDED RELEASE ORAL at 08:16

## 2018-12-03 RX ADMIN — METHADONE HYDROCHLORIDE 30 MG: 10 TABLET ORAL at 08:11

## 2018-12-03 RX ADMIN — Medication 10 ML: at 20:00

## 2018-12-03 RX ADMIN — ACETAMINOPHEN 1000 MG: 500 TABLET ORAL at 16:05

## 2018-12-03 RX ADMIN — IOVERSOL 55 ML: 741 INJECTION INTRA-ARTERIAL; INTRAVENOUS at 14:14

## 2018-12-03 ASSESSMENT — PAIN SCALES - GENERAL
PAINLEVEL_OUTOF10: 1
PAINLEVEL_OUTOF10: 0
PAINLEVEL_OUTOF10: 3

## 2018-12-03 NOTE — PLAN OF CARE
Problem: HEMODYNAMIC STATUS  Goal: Patient has stable vital signs and fluid balance    Intervention: ASSESS RATE AND RHYTHM OF APICAL PULSE  Patient's EF (Ejection Fraction) is less than 40%    Patient has a past medical history of Anxiety; Bipolar 2 disorder ([facility] [address] Congestive heart failure of unknown etiology ([facility] [address] and Sub[facility] abuse ([facility] Comorbidities reviewed and education provided. Patient and family's stated goal of care: increase activity tolerance prior to discharge    Patient's current functional capacity:  Marked limitation of physical activity. Comfortable at rest. Less than ordinary activity causes fatigue, palpitation, or dyspnea. Pt resting in bed at this time on room air. Pt denies shortness of breath. Pt without lower extremity edema. Patient's weights and intake/output reviewed:    Patient Vitals for the past 96 hrs (Last 3 readings):   Weight   12/02/18 0420 131 lb (59.4 kg)   12/01/18 0603 130 lb 12.8 oz (59.3 kg)   11/30/18 1321 131 lb 12.8 oz (59.8 kg)       Intake/Output Summary (Last 24 hours) at 12/03/18 0047  Last data filed at 12/02/18 2333   Gross per 24 hour   Intake             1220 ml   Output             2100 ml   Net             -880 ml       Patient provided with education on CHF signs/symptoms, causes, discharge medications, daily weights, low sodium diet, activity, and follow-up. Notified patient to call the doctor post discharge if patient experiences shortness of breath, chest pain, swelling, cough, or weight gain of three pounds in a day/five pounds in a week. Also notified patient to call the doctor with dizziness, increased fatigue, decreased or difficulty urinating. Pt verbalized understanding. No additional questions at this time.     Education Time: 10 Minutes

## 2018-12-03 NOTE — PROCEDURES
disease. Circ is a modest  size vessel. No disease. Right coronary artery is modest to large size  vessel. No disease. Manual pressure used for arterial hemostasis. CONSCIOUS SEDATION PHYSICIAN WORKSHEET    PROCEDURE START TIME:  13:38. PROCEDURE END TIME:  14:06. A 2 mg of Versed and 100 mcg of fentanyl given without complication. Total contrast 55 mL. CONCLUSION:  Nonischemic cardiomyopathy without evidence for ischemic  heart disease or intracardiac shunting.         Nelson Meckel, MD    D: 12/03/2018 14:38:30       T: 12/03/2018 15:44:48     [email or web address]  Job#: [id number]     Doc#: [id number]    CC:

## 2018-12-03 NOTE — PROGRESS NOTES
computerized transcription errors may be present.     Alicia Isaac MD, JAMEE, [facility], [address] [place] [address]  12/3/2018 1:34 PM

## 2018-12-03 NOTE — BRIEF OP NOTE
[address] [facility]                                           Cardiology Procedure Note - Brief                                                     - Full note dictated -     Patient: Rio Gilman  JMGO:[id number]  Performing Physician: Areli Storey MD JAMEE    Indications: Active Problems:    Congestive heart failure of unknown etiology ([address]  Resolved Problems:    * No resolved hospital problems. *      Procedure Performed:  1. Coronary angiogram  2. Left heart cath  3. Left ventriculogram  4. Right heart cath    Findings:  1. Normal Coronary Angiogram  2. Normal LVEF  3. Normal hemodynamics - left and right     Conclusion/plan of care:  1.  Non-ischemic CMP      Areli Storey MD, [address], [place], [place]  [phone] [facility]  [phone] [facility]  12/3/2018  2:14 PM

## 2018-12-03 NOTE — PRE SEDATION
magnesium hydroxide (MILK OF MAGNESIA) 400 MG/5ML suspension 30 mL  30 mL Oral Daily PRN Donovan Blake MD        enoxaparin (LOVENOX) injection 40 mg  40 mg Subcutaneous Daily Donovan Blake MD   40 mg at 11/30/18 1607    methadone (DOLOPHINE) tablet 30 mg  30 mg Oral Daily DorisNarda Rockwellen, DO   30 mg at 12/03/18 0811    lisinopril (PRINIVIL;ZESTRIL) tablet 5 mg  5 mg Oral Daily Junaid Sleet, DO   5 mg at 12/02/18 3509    furosemide (LASIX) tablet 20 mg  20 mg Oral Daily Jenna Lancaster MD   20 mg at 12/02/18 8078    spironolactone (ALDACTONE) tablet 25 mg  25 mg Oral Daily Jenna Lancaster MD   25 mg at 12/02/18 1274    digoxin (LANOXIN) tablet 125 mcg  125 mcg Oral Daily Jenna Lancaster MD   125 mcg at 12/02/18 5376    metoprolol succinate (TOPROL XL) extended release tablet 25 mg  25 mg Oral Daily Jenna Lancaster MD   25 mg at 12/03/18 7598    acetaminophen (TYLENOL) tablet 1,000 mg  1,000 mg Oral Q6H PRN Junaid Sleet, DO   1,000 mg at 12/02/18 2330           Pre-Sedation:    Pre-Sedation Documentation and Exam:  I have assessed the patient and agree with the H&P present on the chart. Prior History of Anesthesia Complications:   none    Modified Mallampati:  I (soft palate, uvula, fauces, tonsillar pillars visible)    ASA Classification:  Class 3 - A patient with severe systemic disease that limits activity but is not incapacitating    Katya Scale: Activity:  2 - Able to move 4 extremities voluntarily on command  Respiration:  2 - Able to breathe deeply and cough freely  Circulation:  2 - BP+/- 20mmHg of normal  Consciousness:  2 - Fully awake  Oxygen Saturation (color):  2 - Able to maintain oxygen saturation >92% on room air    Sedation/Anesthesia Plan:  Guard the patient's safety and welfare. Minimize physical discomfort and pain. Minimize negative psychological responses to treatment by providing sedation and analgesia and maximize the potential amnesia.   Patient to meet pre-procedure discharge plan.     Medication Planned:  midazolam intravenously, fentanyl intravenously    Patient is an appropriate candidate for plan of sedation: yes      Electronically signed by Trever Zheng MD on 12/3/2018 at 1:36 PM

## 2018-12-03 NOTE — CONSULTS
cups of fluid per day, including water and pop. States she takes all her home medications as prescribed. Patient has a scale at home. Patient denies concerns paying for medications. Patient recent weights and intake/output reviewed:    Patient Vitals for the past 96 hrs (Last 3 readings):   Weight   12/03/18 0509 129 lb 1.6 oz (58.6 kg)   12/02/18 0420 131 lb (59.4 kg)   12/01/18 0603 130 lb 12.8 oz (59.3 kg)         Intake/Output Summary (Last 24 hours) at 12/03/18 1558  Last data filed at 12/03/18 0509   Gross per 24 hour   Intake              740 ml   Output             1900 ml   Net            -1160 ml       Notified patient of scheduled hospital follow-up appointment with Tommy Blanc MD for 12/7 at 10:30 AM and advised pt cardiology will be scheduled prior to discharge. Notified patient to call the doctor post discharge if she experiences shortness of breath, chest pain, swelling, cough, or weight gain of 2-3 pounds in a day / 5 pounds in a week. Also notified patient to call the doctor if she feels dizzy, increased fatigue, decreased or difficulty urinating. Reviewed the red, yellow, green zones of heart failure self management. Encouraged patient to call the MD with early signs of an acute heart failure exacerbation or when in the yellow zone. Patient provided with both written and verbal education on CHF signs/symptoms, causes, discharge medications, daily weights, low sodium diet, activity, fluid restriction, and follow-up. Reviewed activity level recommendations with her EF of 15%. Pt instructed to avoid heavy lifting greater than 15 lb at this time. Emphasized importance of follow up appointments and taking Rx medications. Instructed on lisinopril, Toprol XL and mayte with pt. Pt denies use of illicit drugs to this writer. Pt did have recent dental work and flu vaccine and she wondered if that is the cause of her cardiomyopathy.  Pt offered CHF group classes and pt interested but will consider after she sees her cardiologist in outpatient follow up. Systolic CHF explained. Echo explained and will instructed it will be rechecked at future date of 2-3 months. Advised pt on CHF symptoms and signs of worsening of condition. Office phone number of [facility] given to pt. Pt verbalized understanding. No additional questions at this time. Stated she will alert her nurse with any questions. PATIENT/CAREGIVER TEACHING:    Level of patient/caregiver understanding able to:   [ x ] Verbalize understanding [ ] Demonstrate understanding [ ] Teach back   [ x ] Needs reinforcement [ ] Other:     Education Time: 30 minutes    Recommendations:   1. Encourage follow-up appointment compliance. Next appointment: 12/7 PCP and [facility] cardiology TBD  2. Educate further on fluid restriction 48 oz - 64 oz during inpatient stay so he can understand how to measure intake at home. 3. Review sodium restrictions. Encourage patient to not add table salt to her foods and avoid foods that are high in sodium. 4. Continue to educate on S/S. Stress the importance of calling the MD with the earliest signs of an acute exacerbation. 5. Emphasize daily weights - instruct patient to call the MD if she gains 2-3 lb in a day or 5 lb in a week. 6. Provided patient with CHF Resource Line for questions and concerns. 7. ? CHF group education classes  8. Importance of avoiding any toxins (tobacco, alcohol, illicit drugs) and advised to prevent pregnancy with her heart failure condition.      12/3/2018 3:58 PM

## 2018-12-04 PROBLEM — I47.29 NSVT (NONSUSTAINED VENTRICULAR TACHYCARDIA): Status: ACTIVE | Noted: 2018-12-04

## 2018-12-04 LAB
ANGIOTENSIN CONVERTING ENZYME: 11 U/L (ref 9–67)
ANION GAP SERPL CALCULATED.3IONS-SCNC: 10 MMOL/L (ref 3–16)
BASOPHILS ABSOLUTE: 0.1 K/UL (ref 0–0.2)
BASOPHILS RELATIVE PERCENT: 0.8 %
BUN BLDV-MCNC: 11 MG/DL (ref 7–20)
CALCIUM SERPL-MCNC: 9.1 MG/DL (ref 8.3–10.6)
CHLORIDE BLD-SCNC: 102 MMOL/L (ref 99–110)
CO2: 26 MMOL/L (ref 21–32)
CREAT SERPL-MCNC: 0.8 MG/DL (ref 0.6–1.1)
EKG ATRIAL RATE: 79 BPM
EKG DIAGNOSIS: NORMAL
EKG P AXIS: 65 DEGREES
EKG P-R INTERVAL: 142 MS
EKG Q-T INTERVAL: 388 MS
EKG QRS DURATION: 88 MS
EKG QTC CALCULATION (BAZETT): 444 MS
EKG R AXIS: 82 DEGREES
EKG T AXIS: 260 DEGREES
EKG VENTRICULAR RATE: 79 BPM
EOSINOPHILS ABSOLUTE: 0 K/UL (ref 0–0.6)
EOSINOPHILS RELATIVE PERCENT: 0.6 %
GFR AFRICAN AMERICAN: >60
GFR NON-AFRICAN AMERICAN: >60
GLUCOSE BLD-MCNC: 92 MG/DL (ref 70–99)
HCT VFR BLD CALC: 45 % (ref 36–48)
HEMOGLOBIN: 15.3 G/DL (ref 12–16)
LYMPHOCYTES ABSOLUTE: 2.5 K/UL (ref 1–5.1)
LYMPHOCYTES RELATIVE PERCENT: 36.1 %
MAGNESIUM: 2.1 MG/DL (ref 1.8–2.4)
MCH RBC QN AUTO: 32.5 PG (ref 26–34)
MCHC RBC AUTO-ENTMCNC: 34.1 G/DL (ref 31–36)
MCV RBC AUTO: 95.4 FL (ref 80–100)
MONOCYTES ABSOLUTE: 0.6 K/UL (ref 0–1.3)
MONOCYTES RELATIVE PERCENT: 8.6 %
NEUTROPHILS ABSOLUTE: 3.8 K/UL (ref 1.7–7.7)
NEUTROPHILS RELATIVE PERCENT: 53.9 %
PDW BLD-RTO: 12.3 % (ref 12.4–15.4)
PLATELET # BLD: 385 K/UL (ref 135–450)
PMV BLD AUTO: 8.2 FL (ref 5–10.5)
POTASSIUM SERPL-SCNC: 4.7 MMOL/L (ref 3.5–5.1)
PRO-BNP: 3929 PG/ML (ref 0–124)
RBC # BLD: 4.71 M/UL (ref 4–5.2)
SODIUM BLD-SCNC: 138 MMOL/L (ref 136–145)
WBC # BLD: 7 K/UL (ref 4–11)

## 2018-12-04 PROCEDURE — 99254 IP/OBS CNSLTJ NEW/EST MOD 60: CPT | Performed by: INTERNAL MEDICINE

## 2018-12-04 PROCEDURE — 80048 BASIC METABOLIC PNL TOTAL CA: CPT

## 2018-12-04 PROCEDURE — 93005 ELECTROCARDIOGRAM TRACING: CPT | Performed by: INTERNAL MEDICINE

## 2018-12-04 PROCEDURE — 83880 ASSAY OF NATRIURETIC PEPTIDE: CPT

## 2018-12-04 PROCEDURE — 6370000000 HC RX 637 (ALT 250 FOR IP): Performed by: INTERNAL MEDICINE

## 2018-12-04 PROCEDURE — 85025 COMPLETE CBC W/AUTO DIFF WBC: CPT

## 2018-12-04 PROCEDURE — 36415 COLL VENOUS BLD VENIPUNCTURE: CPT

## 2018-12-04 PROCEDURE — 2580000003 HC RX 258: Performed by: INTERNAL MEDICINE

## 2018-12-04 PROCEDURE — 6370000000 HC RX 637 (ALT 250 FOR IP): Performed by: STUDENT IN AN ORGANIZED HEALTH CARE EDUCATION/TRAINING PROGRAM

## 2018-12-04 PROCEDURE — 99223 1ST HOSP IP/OBS HIGH 75: CPT | Performed by: INTERNAL MEDICINE

## 2018-12-04 PROCEDURE — 1200000000 HC SEMI PRIVATE

## 2018-12-04 PROCEDURE — 83735 ASSAY OF MAGNESIUM: CPT

## 2018-12-04 RX ADMIN — METHADONE HYDROCHLORIDE 30 MG: 10 TABLET ORAL at 09:48

## 2018-12-04 RX ADMIN — METOPROLOL SUCCINATE 25 MG: 25 TABLET, EXTENDED RELEASE ORAL at 09:26

## 2018-12-04 RX ADMIN — ACETAMINOPHEN 1000 MG: 500 TABLET ORAL at 14:07

## 2018-12-04 RX ADMIN — DIGOXIN 125 MCG: 125 TABLET ORAL at 09:27

## 2018-12-04 RX ADMIN — ACETAMINOPHEN 1000 MG: 500 TABLET ORAL at 21:30

## 2018-12-04 RX ADMIN — FUROSEMIDE 20 MG: 40 TABLET ORAL at 09:26

## 2018-12-04 RX ADMIN — LISINOPRIL 5 MG: 5 TABLET ORAL at 09:26

## 2018-12-04 RX ADMIN — Medication 10 ML: at 20:12

## 2018-12-04 RX ADMIN — Medication 10 ML: at 09:26

## 2018-12-04 RX ADMIN — ACETAMINOPHEN 1000 MG: 500 TABLET ORAL at 04:36

## 2018-12-04 RX ADMIN — SPIRONOLACTONE 25 MG: 25 TABLET ORAL at 09:27

## 2018-12-04 ASSESSMENT — PAIN SCALES - GENERAL
PAINLEVEL_OUTOF10: 0
PAINLEVEL_OUTOF10: 2
PAINLEVEL_OUTOF10: 0
PAINLEVEL_OUTOF10: 4
PAINLEVEL_OUTOF10: 4
PAINLEVEL_OUTOF10: 3
PAINLEVEL_OUTOF10: 2

## 2018-12-04 NOTE — DISCHARGE SUMMARY
4.7 12/04/2018     12/04/2018    CO2 26 12/04/2018    BUN 11 12/04/2018    CREATININE 0.8 12/04/2018    CALCIUM 9.1 12/04/2018     Significant Diagnostic Studies    Radiology:   XR CHEST PORTABLE   Final Result   1. Right basilar infiltrate seen yesterday not well demonstrated today   2. Increased perihilar markings on the left with suspected mild left   subpleural edema. Correlate with any clinical findings of CHF or pneumonia         XR CHEST PORTABLE   Final Result   Right basilar atelectasis versus pneumonia, increased from yesterday's exam.         XR CHEST PORTABLE   Final Result   Mild CHF with left perihilar asymmetric edema versus pneumonia. Consults:     IP CONSULT TO CARDIOLOGY  IP CONSULT TO HEART FAILURE NURSE/COORDINATOR  IP CONSULT TO HEART FAILURE NURSE/COORDINATOR    Disposition:  Home     Condition at Discharge: Stable    Discharge Instructions/Follow-up:  Follow up with PCP on Friday 12/7/18. Follow up with CHF clinic. Follow up with electrophysiology for repeat echo in 3 months. Code Status:  Full Code    Activity: activity as tolerated    Diet: regular diet    Discharge Medications:     Current Discharge Medication List           Details   amoxicillin-clavulanate (AUGMENTIN) 875-125 MG per tablet Take 1 tablet by mouth 2 times daily      metoprolol succinate (TOPROL XL) 25 MG extended release tablet Take 1 tablet by mouth daily  Qty: 30 tablet, Refills: 3    Associated Diagnoses: Abnormal chest x-ray; Shortness of breath; Tachycardia      amphetamine-dextroamphetamine (ADDERALL, 20MG,) 20 MG tablet Take 1 tablet by mouth 2 times daily. Qty: 60 tablet, Refills: 0      Norgestimate-Eth Estradiol (SPRINTEC 28 PO) Take by mouth           Time Spent on discharge is more than 30 minutes in the examination, evaluation, counseling and review of medications and discharge plan.     This patient was seen and evaluated with attending Dr. Sameul Schirmer and resident team.    Signed:    [facility]

## 2018-12-04 NOTE — PLAN OF CARE
Problem: HEMODYNAMIC STATUS  Goal: Patient has stable vital signs and fluid balance  Outcome: Ongoing  Patient's EF (Ejection Fraction) is less than 40%    Patient has a past medical history of Anxiety; Bipolar 2 disorder ([facility] Congestive heart failure of unknown etiology ([facility] and Substance abuse ([facility] Comorbidities reviewed and education provided. Patient and family's stated goal of care: better understand heart failure and disease management prior to discharge    Patient's current functional capacity:  No limitation of physical activity. Ordinary physical activity does not cause undue fatigue, palpitation, dyspnea. Pt resting in bed at this time on room air. Pt denies shortness of breath. Pt without lower extremity edema. Patient's weights and intake/output reviewed:    Patient Vitals for the past 96 hrs (Last 3 readings):   Weight   12/03/18 0509 129 lb 1.6 oz (58.6 kg)   12/02/18 0420 131 lb (59.4 kg)   12/01/18 0603 130 lb 12.8 oz (59.3 kg)       Intake/Output Summary (Last 24 hours) at 12/04/18 0138  Last data filed at 12/03/18 2218   Gross per 24 hour   Intake              480 ml   Output             2100 ml   Net            -1620 ml       Patient provided with education on  daily weights    Instructed patient to call the doctor post discharge if she experiences shortness of breath, chest pain, swelling, cough, or weight gain of three pounds in a day/five pounds in a week. Also notified patient to call the doctor with dizziness, increased fatigue, decreased or difficulty urinating. Pt verbalized understanding. No additional questions at this time.     Education Time: 10 Minutes

## 2018-12-04 NOTE — PLAN OF CARE
Problem: ACTIVITY INTOLERANCE/IMPAIRED MOBILITY  Goal: Mobility/activity is maintained at optimum level for patient  Outcome: Ongoing  Patient's EF (Ejection Fraction) is less than 40%    Patient has a past medical history of Anxiety; Bipolar 2 disorder ([facility] Congestive heart failure of unknown etiology ([facility] and Substance abuse ([facility] Comorbidities reviewed and education provided. Patient and family's stated goal of care: increase activity tolerance prior to discharge    Patient's current functional capacity:  Slight limitation of physical activity. Comfortable at rest. Ordinary physical activity results in fatigue, palpitation, dyspnea. Pt sitting in bed at this time on room air. Pt denies shortness of breath. Pt with nonpitting lower extremity edema. Patient's weights and intake/output reviewed:    Patient Vitals for the past 96 hrs (Last 3 readings):   Weight   12/04/18 0615 128 lb 6.4 oz (58.2 kg)   12/03/18 0509 129 lb 1.6 oz (58.6 kg)   12/02/18 0420 131 lb (59.4 kg)       Intake/Output Summary (Last 24 hours) at 12/04/18 1128  Last data filed at 12/04/18 1047   Gross per 24 hour   Intake              960 ml   Output             2150 ml   Net            -1190 ml       Patient provided with education on  activity    Instructed patient to call the doctor post discharge if she experiences shortness of breath, chest pain, swelling, cough, or weight gain of three pounds in a day/five pounds in a week. Also notified patient to call the doctor with dizziness, increased fatigue, decreased or difficulty urinating. Pt verbalized understanding. No additional questions at this time.     Education Time: 10 Minutes

## 2018-12-04 NOTE — CONSULTS
[address]  Advanced CHF/Pulmonary Hypertension   Cardiac Evaluation      Anyi Michael  YOB: 1985    Requesting PHysician:  Dr. Bridger Dc    Chief complaint:  Shortness of breath, new diagnosis of heart failure    History of Present Illness:  Frieda Riedel is a 36 yo female admitted directly last week from our office for acute HF. She had been having intermittent exertional shortness of breath for the past couple of weeks as well as intermittent nonproductive cough. She had 4 teeth extracted 4 days prior, her SOB worsened. Her PCP ordered a CXR showing CHF. She was referred to Dr. Maris Painter who ordered an echo showing an EF 16%. She was diuresed and placed on appropriate medical therapy. Yesterday she had a cardiac cath with results below. She is resting quietly in bed. Shortness of breath is much better. No family history of CAD or CHF. She is a former user of opiates, heroin, etc, quit 10 years ago. She had been on Adderall for ADD. No recent fevers or chills, abdominal pain, nausea, vomiting, or diarrhea. No sick contacts. She has been on methadone 30 mg daily for opiate treatment. No recent virus. No recent pregnancy. Cardiac cath:  12/3/18:  Normal coronary arteries   Right atrial pressure  of 4, RV pressure of 24/6, PA pressure of 24/11 with a mean of 18. Pulmonary artery wedge pressure of 11. Cardiac output of 3.11, index of  1.94, SVR is 1903, PVR is 200. PA saturation is 57%. Right atrial  saturation is 61% and RV saturation 59%. Aortic saturation 98%. Echo:  11/30/18:  Pasqual Queen left ventricular systolic function is severely reduced with an ejection   fraction of 15-20 %.   EF by Kay's method estimated at 16%.   Upper limits of normal left ventricle size.   Severe global hypokinesis. Paradoxical septal motion. No intracardiac   thrombus.   Elevated left ventricular diastolic filling pressure.   The right ventricle is normal in size with decreased Substance abuse ([facility]     herion     Past Surgical History:   Procedure Laterality Date    APPENDECTOMY       No family history on file. Social History     Social History    Marital status: Single     Spouse name: N/A    Number of children: N/A    Years of education: N/A     Occupational History    Not on file. Social History Main Topics    Smoking status: Never Smoker    Smokeless tobacco: Never Used    Alcohol use 0.6 oz/week     1 Cans of beer per week    Drug use: No      Comment: Methadone, Herion 4years clean    Sexual activity: Yes     Other Topics Concern    Not on file     Social History Narrative    No narrative on file       Review of Systems:   · Constitutional: there has been no unanticipated weight loss. There's been no change in energy level, sleep pattern, or activity level. · Eyes: No visual changes or diplopia. No scleral icterus. · ENT: No Headaches, hearing loss or vertigo. No mouth sores or sore throat. · Cardiovascular: Reviewed in HPI  · Respiratory: No cough or wheezing, no sputum production. No hematemesis. · Gastrointestinal: No abdominal pain, appetite loss, blood in stools. No change in bowel or bladder habits. · Genitourinary: No dysuria, trouble voiding, or hematuria. · Musculoskeletal:  No gait disturbance, weakness or joint complaints. · Integumentary: No rash or pruritis. · Neurological: No headache, diplopia, change in muscle strength, numbness or tingling. No change in gait, balance, coordination, mood, affect, memory, mentation, behavior. · Psychiatric: No anxiety, no depression. · Endocrine: No malaise, fatigue or temperature intolerance. No excessive thirst, fluid intake, or urination. No tremor. · Hematologic/Lymphatic: No abnormal bruising or bleeding, blood clots or swollen lymph nodes. · Allergic/Immunologic: No nasal congestion or hives.     Physical Examination:    Vitals:    12/04/18 0615 12/04/18 0915 12/04/18 1229 12/04/18 1515   BP: 106/72 89/60 (!) 89/53   Pulse:  90 90 80   Resp:  16 16 16   Temp:  97.8 °F (36.6 °C) 97.8 °F (36.6 °C) 97.7 °F (36.5 °C)   TempSrc:  Oral Oral Oral   SpO2:  99% 97% 95%   Weight: 128 lb 6.4 oz (58.2 kg)      Height:         Body mass index is 22.75 kg/m². Wt Readings from Last 3 Encounters:   12/04/18 128 lb 6.4 oz (58.2 kg)   11/30/18 132 lb 9.6 oz (60.1 kg)   07/20/16 125 lb (56.7 kg)     BP Readings from Last 3 Encounters:   12/04/18 (!) 89/53   11/30/18 90/70   07/20/16 115/78     Constitutional and General Appearance:   WD/WN in NAD  HEENT:  NC/AT  YASMINE  No problems with hearing  Skin:  Warm, dry  Respiratory:  · Normal excursion and expansion without use of accessory muscles  · Resp Auscultation: Normal breath sounds without dullness  Cardiovascular:  · The apical impulses not displaced  · Heart tones are crisp and normal  · Cervical veins are not engorged  · The carotid upstroke is normal in amplitude and contour without delay or bruit  · JVP less than 8 cm H2O  RRR with nl S1 and S2 without m,r,g  · Peripheral pulses are symmetrical and full  · There is no clubbing, cyanosis of the extremities. · No edema  · Femoral Arteries: 2+ and equal  · Pedal Pulses: 2+ and equal   Neck:  · No thyromegaly  Abdomen:  · No masses or tenderness  · Liver/Spleen: No Abnormalities Noted  Neurological/Psychiatric:  · Alert and oriented in all spheres  · Moves all extremities well  · Exhibits normal gait balance and coordination  · No abnormalities of mood, affect, memory, mentation, or behavior are noted      Assessment:    1,  Acute systolic HF:  Unclear etiology. Likely viral vs autoimmune (idiopathic)  2. History of heroin use, remote  3. ADD on Adderall    Plan:  Continue lisinopril, coreg, spironolactone, digoxin, lasix  BNP level is down to 4K from admit  Stay off Adderall  Should get fitted for LifeVest tomorrow  CHF education reinforced.   ~salt restriction  ~fluid restriction  ~medication compliance  ~daily

## 2018-12-04 NOTE — PROGRESS NOTES
Pt bedrest ended @ 2040; pt called out to be repositioned. Writer educated pt to call for site pain or drainage. Pt currently in bed lying semi fowlers with no complaints.

## 2018-12-05 VITALS
HEIGHT: 63 IN | HEART RATE: 47 BPM | OXYGEN SATURATION: 97 % | BODY MASS INDEX: 24.17 KG/M2 | WEIGHT: 136.4 LBS | RESPIRATION RATE: 18 BRPM | SYSTOLIC BLOOD PRESSURE: 94 MMHG | TEMPERATURE: 97.9 F | DIASTOLIC BLOOD PRESSURE: 41 MMHG

## 2018-12-05 LAB
ANION GAP SERPL CALCULATED.3IONS-SCNC: 10 MMOL/L (ref 3–16)
BASOPHILS ABSOLUTE: 0.1 K/UL (ref 0–0.2)
BASOPHILS RELATIVE PERCENT: 0.7 %
BUN BLDV-MCNC: 10 MG/DL (ref 7–20)
CALCIUM SERPL-MCNC: 8.6 MG/DL (ref 8.3–10.6)
CHLORIDE BLD-SCNC: 104 MMOL/L (ref 99–110)
CO2: 25 MMOL/L (ref 21–32)
CREAT SERPL-MCNC: 0.7 MG/DL (ref 0.6–1.1)
EOSINOPHILS ABSOLUTE: 0.1 K/UL (ref 0–0.6)
EOSINOPHILS RELATIVE PERCENT: 0.9 %
GFR AFRICAN AMERICAN: >60
GFR NON-AFRICAN AMERICAN: >60
GLUCOSE BLD-MCNC: 96 MG/DL (ref 70–99)
HCT VFR BLD CALC: 41.9 % (ref 36–48)
HEMOGLOBIN: 14.4 G/DL (ref 12–16)
LYMPHOCYTES ABSOLUTE: 2.5 K/UL (ref 1–5.1)
LYMPHOCYTES RELATIVE PERCENT: 34.2 %
MCH RBC QN AUTO: 32.5 PG (ref 26–34)
MCHC RBC AUTO-ENTMCNC: 34.3 G/DL (ref 31–36)
MCV RBC AUTO: 94.5 FL (ref 80–100)
MONOCYTES ABSOLUTE: 0.6 K/UL (ref 0–1.3)
MONOCYTES RELATIVE PERCENT: 7.6 %
NEUTROPHILS ABSOLUTE: 4.2 K/UL (ref 1.7–7.7)
NEUTROPHILS RELATIVE PERCENT: 56.6 %
PDW BLD-RTO: 12.4 % (ref 12.4–15.4)
PLATELET # BLD: 340 K/UL (ref 135–450)
PMV BLD AUTO: 8.2 FL (ref 5–10.5)
POTASSIUM SERPL-SCNC: 4.7 MMOL/L (ref 3.5–5.1)
RBC # BLD: 4.43 M/UL (ref 4–5.2)
SODIUM BLD-SCNC: 139 MMOL/L (ref 136–145)
WBC # BLD: 7.5 K/UL (ref 4–11)

## 2018-12-05 PROCEDURE — 36415 COLL VENOUS BLD VENIPUNCTURE: CPT

## 2018-12-05 PROCEDURE — 80048 BASIC METABOLIC PNL TOTAL CA: CPT

## 2018-12-05 PROCEDURE — 6370000000 HC RX 637 (ALT 250 FOR IP): Performed by: INTERNAL MEDICINE

## 2018-12-05 PROCEDURE — 85025 COMPLETE CBC W/AUTO DIFF WBC: CPT

## 2018-12-05 PROCEDURE — 6370000000 HC RX 637 (ALT 250 FOR IP): Performed by: STUDENT IN AN ORGANIZED HEALTH CARE EDUCATION/TRAINING PROGRAM

## 2018-12-05 PROCEDURE — 2580000003 HC RX 258: Performed by: INTERNAL MEDICINE

## 2018-12-05 RX ORDER — DIGOXIN 125 MCG
125 TABLET ORAL DAILY
Qty: 30 TABLET | Refills: 3 | Status: SHIPPED | OUTPATIENT
Start: 2018-12-06 | End: 2019-04-04 | Stop reason: SDUPTHER

## 2018-12-05 RX ORDER — METOPROLOL SUCCINATE 25 MG/1
25 TABLET, EXTENDED RELEASE ORAL DAILY
Qty: 30 TABLET | Refills: 3 | Status: SHIPPED | OUTPATIENT
Start: 2018-12-06 | End: 2018-12-11 | Stop reason: SDUPTHER

## 2018-12-05 RX ORDER — FUROSEMIDE 20 MG/1
20 TABLET ORAL DAILY
Qty: 60 TABLET | Refills: 3 | Status: SHIPPED | OUTPATIENT
Start: 2018-12-06 | End: 2018-12-11 | Stop reason: SDUPTHER

## 2018-12-05 RX ORDER — LISINOPRIL 5 MG/1
5 TABLET ORAL DAILY
Qty: 30 TABLET | Refills: 3 | Status: SHIPPED | OUTPATIENT
Start: 2018-12-06 | End: 2019-01-16 | Stop reason: SDUPTHER

## 2018-12-05 RX ORDER — SPIRONOLACTONE 25 MG/1
25 TABLET ORAL DAILY
Qty: 30 TABLET | Refills: 3 | Status: SHIPPED | OUTPATIENT
Start: 2018-12-06 | End: 2019-04-04 | Stop reason: SDUPTHER

## 2018-12-05 RX ADMIN — ACETAMINOPHEN 1000 MG: 500 TABLET ORAL at 04:38

## 2018-12-05 RX ADMIN — FUROSEMIDE 20 MG: 40 TABLET ORAL at 08:19

## 2018-12-05 RX ADMIN — Medication 10 ML: at 08:20

## 2018-12-05 RX ADMIN — DIGOXIN 125 MCG: 125 TABLET ORAL at 08:19

## 2018-12-05 RX ADMIN — METOPROLOL SUCCINATE 25 MG: 25 TABLET, EXTENDED RELEASE ORAL at 08:19

## 2018-12-05 RX ADMIN — METHADONE HYDROCHLORIDE 30 MG: 10 TABLET ORAL at 08:19

## 2018-12-05 RX ADMIN — LISINOPRIL 5 MG: 5 TABLET ORAL at 08:20

## 2018-12-05 RX ADMIN — SPIRONOLACTONE 25 MG: 25 TABLET ORAL at 08:20

## 2018-12-05 ASSESSMENT — PAIN SCALES - GENERAL
PAINLEVEL_OUTOF10: 3
PAINLEVEL_OUTOF10: 0
PAINLEVEL_OUTOF10: 0
PAINLEVEL_OUTOF10: 3
PAINLEVEL_OUTOF10: 0

## 2018-12-05 ASSESSMENT — PAIN DESCRIPTION - LOCATION: LOCATION: MOUTH

## 2018-12-05 ASSESSMENT — PAIN DESCRIPTION - PAIN TYPE: TYPE: ACUTE PAIN

## 2018-12-05 NOTE — PLAN OF CARE
Problem: HEMODYNAMIC STATUS  Goal: Patient has stable vital signs and fluid balance    Intervention: MONITOR PATIENT'S WEIGHT  Patient's EF (Ejection Fraction) is 15%- Lifevest ordered and in place on patient. Pt is able to teach back correct self care with vest/batteries etc.     Patient has a past medical history of Anxiety; Attention deficit disorder (ADD) without hyperactivity; Bipolar 2 disorder ([facility] Congestive heart failure of unknown etiology ([facility] [facility] and Substance abuse ([facility] Comorbidities reviewed and education provided. Pt has not been taking her Aderrall and will discuss with her PCP are cardiologist before resuming. Patient and family's stated goal of care: \"to get my heart stronger eventually\" as her long term goal.     Patient's current functional capacity:  Slight limitation of physical activity. Comfortable at rest. Ordinary physical activity results in fatigue, palpitation, dyspnea. Pt is ambulating in her room and hallways without SOB at this time. She reports feeling \"much better\"    Pt up in chair at this time on room air. Pt denies shortness of breath. Pt without lower extremity edema. Patient's weights and intake/output reviewed:    Question today's weight accuracy:    Patient Vitals for the past 96 hrs (Last 3 readings):   Weight   12/05/18 0425 136 lb 6.4 oz (61.9 kg)   12/04/18 0615 128 lb 6.4 oz (58.2 kg)   12/03/18 0509 129 lb 1.6 oz (58.6 kg)       Intake/Output Summary (Last 24 hours) at 12/05/18 1248  Last data filed at 12/05/18 3405   Gross per 24 hour   Intake              970 ml   Output             3050 ml   Net            -2080 ml       Patient provided with education on : reviewed daily weights, low EF 15% recommended level of activity and to avoid heavy lifting more than 15 lb at this time. Advised to avoid sexual activity at this time and rational of avoiding pregnancy with her new heart failure medications. Instructed on LifeVest. Pt is wearing at this time.  Discussed

## 2018-12-07 ENCOUNTER — TELEPHONE (OUTPATIENT)
Dept: CARDIOLOGY CLINIC | Age: 33
End: 2018-12-07

## 2018-12-07 ENCOUNTER — TELEPHONE (OUTPATIENT)
Dept: TELEMETRY | Age: 33
End: 2018-12-07

## 2018-12-07 LAB — URINE ELECTROPHORESIS INTERP: NORMAL

## 2018-12-11 ENCOUNTER — OFFICE VISIT (OUTPATIENT)
Dept: CARDIOLOGY CLINIC | Age: 33
End: 2018-12-11
Payer: COMMERCIAL

## 2018-12-11 VITALS
OXYGEN SATURATION: 98 % | HEART RATE: 81 BPM | HEIGHT: 63 IN | WEIGHT: 127.4 LBS | BODY MASS INDEX: 22.57 KG/M2 | DIASTOLIC BLOOD PRESSURE: 60 MMHG | SYSTOLIC BLOOD PRESSURE: 90 MMHG

## 2018-12-11 DIAGNOSIS — I50.22 CHRONIC SYSTOLIC HEART FAILURE (HCC): Primary | ICD-10-CM

## 2018-12-11 DIAGNOSIS — I42.8 NONISCHEMIC CARDIOMYOPATHY (HCC): ICD-10-CM

## 2018-12-11 DIAGNOSIS — I47.29 NSVT (NONSUSTAINED VENTRICULAR TACHYCARDIA): ICD-10-CM

## 2018-12-11 PROCEDURE — G8484 FLU IMMUNIZE NO ADMIN: HCPCS | Performed by: NURSE PRACTITIONER

## 2018-12-11 PROCEDURE — 99214 OFFICE O/P EST MOD 30 MIN: CPT | Performed by: NURSE PRACTITIONER

## 2018-12-11 PROCEDURE — G8427 DOCREV CUR MEDS BY ELIG CLIN: HCPCS | Performed by: NURSE PRACTITIONER

## 2018-12-11 PROCEDURE — 1036F TOBACCO NON-USER: CPT | Performed by: NURSE PRACTITIONER

## 2018-12-11 PROCEDURE — 1111F DSCHRG MED/CURRENT MED MERGE: CPT | Performed by: NURSE PRACTITIONER

## 2018-12-11 PROCEDURE — G8420 CALC BMI NORM PARAMETERS: HCPCS | Performed by: NURSE PRACTITIONER

## 2018-12-11 RX ORDER — METOPROLOL SUCCINATE 25 MG/1
25 TABLET, EXTENDED RELEASE ORAL 2 TIMES DAILY
Qty: 60 TABLET | Refills: 1 | Status: SHIPPED | OUTPATIENT
Start: 2018-12-11 | End: 2019-03-15 | Stop reason: SDUPTHER

## 2018-12-11 RX ORDER — FUROSEMIDE 20 MG/1
20 TABLET ORAL SEE ADMIN INSTRUCTIONS
Qty: 60 TABLET | Refills: 3 | Status: SHIPPED | OUTPATIENT
Start: 2018-12-11 | End: 2019-12-13 | Stop reason: SDUPTHER

## 2018-12-11 NOTE — PROGRESS NOTES
reduced with an ejection   fraction of 15-20 %.   EF by Kay's method estimated at 16%.   Upper limits of normal left ventricle size.   Severe global hypokinesis. Paradoxical septal motion. No intracardiac   thrombus.   Elevated left ventricular diastolic filling pressure.   The right ventricle is normal in size with decreased function.   The left atrium is moderately dilated.   The right atrium is mildly dilated.   Color flow demonstrates eccentric jet suggesting moderate mitral   regurgitation.   Mild pulmonic regurgitation.   Moderate tricuspid regurgitation.   Systolic pulmonary artery pressure (SPAP) is normal and estimated at 29 mmHg   (right atrial pressure 8 mmHg). Cardiac cath:  12/3/18:  Normal coronary arteries   Right atrial pressure  of 4, RV pressure of 24/6, PA pressure of 24/11 with a mean of 18. Pulmonary artery wedge pressure of 11.  Cardiac output of 3.11, index of  1.94, SVR is 1903, PVR is 200.  PA saturation is 57%.  Right atrial  saturation is 61% and RV saturation 59%.  Aortic saturation 98%.     NYHA:   I  ACC/ AHA Stage:    C    Pertinent Problems:  · Non-ischemic cardiomyopathy. LVEF 87%  · Systolic heart failure   · Hx of polysubstance abuse  · Hx of ADD previously on adderall    Visit Diagnosis:    1. Chronic systolic heart failure ([address]    2. Nonischemic cardiomyopathy ([address]    3. NSVT (nonsustained ventricular tachycardia) ([address]        Plan:   1. Check BNP and BMP every 2 weeks starting in about 1 week  2. Stop lasix for now and only use as needed for weight gain of 3lbs in a day or 5lbs in a week. 3. Increase metoprolol 25mg twice a day. Continue to use your current prescription and then  the higher quantity. 4. Continue life vest  5. Follow up 2 weeks for further med titration and possible limited echo       QUALITY MEASURES  1. Tobacco Cessation Counseling: NA  2. Retake of BP if >140/90:   NA  3.  Documentation to PCP/referring for new patient:  Sent to PCP at close

## 2018-12-11 NOTE — LETTER
[address] Cardiology - [facility] [place] [address] EMERY Hernandez. [address]  Phone: [phone]  Fax: 1355 [facility], APRN - CNP        December 11, 2018     Edil Lin MD  [address] [facility] [address]    Patient: Gagandeep Magana  MR Number: [id number]  YOB: 1985  Date of Visit: 12/11/2018    Dear Dr. Edil Lin:    I recently saw our mutual patient, listed above. Below are the relevant portions of my assessment and plan of care. [address]   Cardiac Follow-up    Primary Care Doctor:  Edil Lin MD    Chief Complaint   Patient presents with    Follow-up    Other     pt felt bloatd this morning ( air in stomach)         History of Present Illness:   I had the pleasure of seeing Gagandeep Found in follow up for cardiomyopathy, newly diagnosed. Admitted from 11/30/18-12/5/18 for shortness of breath. LVEF 16% on echo. Cardiac cath was negative. Started and discharged with Toprol, lisinopril, digoxin, Lasix, and spironolactone. Stopped Adderall due to NSVT seen on EKG. Since discharge she is feeling better, no edema. No more orthopnea. Having some bloating in the abd  intermittently. Compliant with medications, taking all medication at the same time without side effects. No dizziness or lightheadedness. 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 Overall, she is much improved. Gagandeep Found describes symptoms including fatigue but denies chest pain, dyspnea, palpitations, orthopnea, PND, edema, syncope. Home weights: 124lbs    Past Medical History:   has a past medical history of Anxiety; Attention deficit disorder (ADD) without hyperactivity; Bipolar 2 disorder ([address] Congestive heart failure of unknown etiology ([address] and Substance abuse ([facility] Surgical History:   has a past surgical history that includes Appendectomy.    Social History: reports that she has never smoked. She has never used smokeless tobacco. She reports that she drinks about 0.6 oz of alcohol per week . She reports that she does not use drugs. Family History:   History reviewed. No pertinent family history. Home Medications:  Prior to Admission medications    Medication Sig Start Date End Date Taking? Authorizing Provider   metoprolol succinate (TOPROL XL) 25 MG extended release tablet Take 1 tablet by mouth daily 12/6/18  Yes Marvin Hebert DO   lisinopril (PRINIVIL;ZESTRIL) 5 MG tablet Take 1 tablet by mouth daily 12/6/18  Yes Marvin Hebert DO   furosemide (LASIX) 20 MG tablet Take 1 tablet by mouth daily 12/6/18  Yes Marvin Hebert DO   spironolactone (ALDACTONE) 25 MG tablet Take 1 tablet by mouth daily 12/6/18  Yes Marvin Hebert DO   digoxin [facility]) 125 MCG tablet Take 1 tablet by mouth daily 12/6/18  Yes Marvin Hebert DO   Norgestimate-Eth Estradiol (SPRINTEC 28 PO) Take by mouth   Yes Historical Provider, MD        Allergies:  Seroquel [quetiapine fumarate]     Review of Systems:   · Constitutional: there has been no unanticipated weight loss. · Eyes: No vision changes  · ENT: No Headaches, no nasal congestion. No mouth sores or sore throat. · Cardiovascular: Reviewed in HPI  · Respiratory: No cough or wheezing, no sputum production. · Gastrointestinal: No abdominal pain, no constipation or diarrhea  · Genitourinary: No dysuria, trouble voiding, or hematuria. · Musculoskeletal:  No weakness or joint complaints. · Integumentary: No rash or pruritis. · Neurological: No numbness or tingling. No weakness. No tremor. · Psychiatric: No anxiety, no depression. · Endocrine:  No excessive thirst or urination. · Hematologic/Lymphatic: No abnormal bruising or bleeding, blood clots or swollen lymph nodes.     Physical Examination:    Vitals:    12/11/18 1425   BP: 90/60   Pulse: 81   SpO2: 98%   Weight: 127 lb 6.4 oz (57.8 kg)   Height: 5' 3\" (1.6 m) CREATININE 0.7 12/05/2018    CREATININE 0.8 12/04/2018    CREATININE 0.7 12/03/2018     BNP:   Lab Results   Component Value Date    PROBNP 3,929 12/04/2018    PROBNP 6,438 11/30/2018     Lipids: No results found for: CHOL   No results found for: TRIG   No results found for: HDL   No results found for: LDLCHOLESTEROL, LDLCALC   No results found for: LABVLDL, VLDL   No results found for: CHOLHDLRATIO    EF: No results found for: LVEF, LVEFMODE    Recent Testing:  Echo 11/13/18   The left ventricular systolic function is severely reduced with an ejection   fraction of 15-20 %.   EF by Kay's method estimated at 16%.   Upper limits of normal left ventricle size.   Severe global hypokinesis. Paradoxical septal motion. No intracardiac   thrombus.   Elevated left ventricular diastolic filling pressure.   The right ventricle is normal in size with decreased function.   The left atrium is moderately dilated.   The right atrium is mildly dilated.   Color flow demonstrates eccentric jet suggesting moderate mitral   regurgitation.   Mild pulmonic regurgitation.   Moderate tricuspid regurgitation.   Systolic pulmonary artery pressure (SPAP) is normal and estimated at 29 mmHg   (right atrial pressure 8 mmHg). Cardiac cath:  12/3/18:  Normal coronary arteries   Right atrial pressure  of 4, RV pressure of 24/6, PA pressure of 24/11 with a mean of 18. Pulmonary artery wedge pressure of 11.  Cardiac output of 3.11, index of  1.94, SVR is 1903, PVR is 200.  PA saturation is 57%.  Right atrial  saturation is 61% and RV saturation 59%.  Aortic saturation 98%.     NYHA:   I  ACC/ AHA Stage:    C    Pertinent Problems:  · Non-ischemic cardiomyopathy. LVEF 05%  · Systolic heart failure   · Hx of polysubstance abuse  · Hx of ADD previously on adderall    Visit Diagnosis:    1. Chronic systolic heart failure ([address]    2. Nonischemic cardiomyopathy ([address]    3.  NSVT (nonsustained ventricular tachycardia) ([address]        Plan:

## 2018-12-17 ENCOUNTER — HOSPITAL ENCOUNTER (OUTPATIENT)
Age: 33
Discharge: HOME | End: 2018-12-17
Payer: COMMERCIAL

## 2018-12-17 DIAGNOSIS — I50.22 CHRONIC SYSTOLIC HEART FAILURE (HCC): ICD-10-CM

## 2018-12-17 LAB
ANION GAP SERPL CALCULATED.3IONS-SCNC: 12 MMOL/L (ref 3–16)
BUN BLDV-MCNC: 11 MG/DL (ref 7–20)
CALCIUM SERPL-MCNC: 9.3 MG/DL (ref 8.3–10.6)
CHLORIDE BLD-SCNC: 100 MMOL/L (ref 99–110)
CO2: 27 MMOL/L (ref 21–32)
CREAT SERPL-MCNC: 0.7 MG/DL (ref 0.6–1.1)
GFR AFRICAN AMERICAN: >60
GFR NON-AFRICAN AMERICAN: >60
GLUCOSE BLD-MCNC: 79 MG/DL (ref 70–99)
POTASSIUM SERPL-SCNC: 4.3 MMOL/L (ref 3.5–5.1)
PRO-BNP: 3208 PG/ML (ref 0–124)
SODIUM BLD-SCNC: 139 MMOL/L (ref 136–145)

## 2018-12-17 PROCEDURE — 83880 ASSAY OF NATRIURETIC PEPTIDE: CPT

## 2018-12-17 PROCEDURE — 36415 COLL VENOUS BLD VENIPUNCTURE: CPT

## 2018-12-17 PROCEDURE — 80048 BASIC METABOLIC PNL TOTAL CA: CPT

## 2018-12-27 ENCOUNTER — OFFICE VISIT (OUTPATIENT)
Dept: CARDIOLOGY CLINIC | Age: 33
End: 2018-12-27
Payer: COMMERCIAL

## 2018-12-27 VITALS
WEIGHT: 125.4 LBS | DIASTOLIC BLOOD PRESSURE: 58 MMHG | OXYGEN SATURATION: 96 % | HEART RATE: 61 BPM | BODY MASS INDEX: 22.22 KG/M2 | SYSTOLIC BLOOD PRESSURE: 88 MMHG | HEIGHT: 63 IN

## 2018-12-27 DIAGNOSIS — I50.22 CHRONIC SYSTOLIC HEART FAILURE (HCC): Primary | ICD-10-CM

## 2018-12-27 DIAGNOSIS — I42.8 NONISCHEMIC CARDIOMYOPATHY (HCC): ICD-10-CM

## 2018-12-27 DIAGNOSIS — I47.29 NSVT (NONSUSTAINED VENTRICULAR TACHYCARDIA): ICD-10-CM

## 2018-12-27 PROCEDURE — G8484 FLU IMMUNIZE NO ADMIN: HCPCS | Performed by: NURSE PRACTITIONER

## 2018-12-27 PROCEDURE — 1036F TOBACCO NON-USER: CPT | Performed by: NURSE PRACTITIONER

## 2018-12-27 PROCEDURE — G8427 DOCREV CUR MEDS BY ELIG CLIN: HCPCS | Performed by: NURSE PRACTITIONER

## 2018-12-27 PROCEDURE — G8420 CALC BMI NORM PARAMETERS: HCPCS | Performed by: NURSE PRACTITIONER

## 2018-12-27 PROCEDURE — 1111F DSCHRG MED/CURRENT MED MERGE: CPT | Performed by: NURSE PRACTITIONER

## 2018-12-27 PROCEDURE — 99214 OFFICE O/P EST MOD 30 MIN: CPT | Performed by: NURSE PRACTITIONER

## 2018-12-27 RX ORDER — BLOOD PRESSURE TEST KIT
1 KIT MISCELLANEOUS SEE ADMIN INSTRUCTIONS
Qty: 1 KIT | Refills: 0 | Status: SHIPPED | OUTPATIENT
Start: 2018-12-27

## 2018-12-27 NOTE — PROGRESS NOTES
normal  · Radial pulses 2+ and equal bilaterally  · No edema  · Pedal Pulses: 2+ and equal   Abdomen:  · No masses or tenderness  · Liver: No Abnormalities Noted  Musculoskeletal/Skin:  · Exhibits normal gait balance and coordination  · There is no clubbing, cyanosis of the extremities  · Skin is warm and dry  · Moves all extremities well  Neurological/Psychiatric:  · Alert and oriented in all spheres  · No abnormalities of mood, affect, memory, mentation, or behavior are noted    Lab Data:  CBC:   Lab Results   Component Value Date    WBC 7.5 12/05/2018    WBC 7.0 12/04/2018    WBC 7.5 12/03/2018    RBC 4.43 12/05/2018    RBC 4.71 12/04/2018    RBC 4.21 12/03/2018    HGB 14.4 12/05/2018    HGB 15.3 12/04/2018    HGB 13.6 12/03/2018    HCT 41.9 12/05/2018    HCT 45.0 12/04/2018    HCT 40.3 12/03/2018    MCV 94.5 12/05/2018    MCV 95.4 12/04/2018    MCV 95.7 12/03/2018    RDW 12.4 12/05/2018    RDW 12.3 12/04/2018    RDW 12.6 12/03/2018     12/05/2018     12/04/2018     12/03/2018     Iron:   Lab Results   Component Value Date    IRON 51 11/30/2018    TIBC 453 (H) 11/30/2018    FERRITIN 39.3 11/30/2018     BMP:   Lab Results   Component Value Date     12/17/2018     12/05/2018     12/04/2018    K 4.3 12/17/2018    K 4.7 12/05/2018    K 4.7 12/04/2018     12/17/2018     12/05/2018     12/04/2018    CO2 27 12/17/2018    CO2 25 12/05/2018    CO2 26 12/04/2018    BUN 11 12/17/2018    BUN 10 12/05/2018    BUN 11 12/04/2018    CREATININE 0.7 12/17/2018    CREATININE 0.7 12/05/2018    CREATININE 0.8 12/04/2018     BNP:   Lab Results   Component Value Date    PROBNP 3,208 12/17/2018    PROBNP 3,929 12/04/2018    PROBNP 6,438 11/30/2018     Lipids: No results found for: CHOL   No results found for: TRIG   No results found for: HDL   No results found for: LDLCHOLESTEROL, LDLCALC   No results found for: LABVLDL, VLDL   No results found for: CHOLHDLRATIO    EF: No results

## 2018-12-27 NOTE — LETTER
She reports that she drinks about 0.6 oz of alcohol per week . She reports that she does not use drugs. Family History:   No family history on file. Home Medications:  Prior to Admission medications    Medication Sig Start Date End Date Taking? Authorizing Provider   furosemide (LASIX) 20 MG tablet Take 1 tablet by mouth See Admin Instructions As needed for weight gain 3lbs in a day or 5lbs in a week 12/11/18   DANIEL Anand CNP   metoprolol succinate (TOPROL XL) 25 MG extended release tablet Take 1 tablet by mouth 2 times daily 12/11/18   DANIEL Anand - CNP   lisinopril (PRINIVIL;ZESTRIL) 5 MG tablet Take 1 tablet by mouth daily 12/6/18   Kingston Cooks    spironolactone (ALDACTONE) 25 MG tablet Take 1 tablet by mouth daily 12/6/18   Kingston Cooks, DO   digoxin [facility]) 125 MCG tablet Take 1 tablet by mouth daily 12/6/18   Kingston Cooks,    Norgestimate-Eth Estradiol (SPRINTEC 28 PO) Take by mouth    Historical Provider, MD        Allergies:  Seroquel [quetiapine fumarate]     Review of Systems:   · Constitutional: there has been no unanticipated weight loss. · Eyes: No vision changes  · ENT: No Headaches, no nasal congestion. No mouth sores or sore throat. · Cardiovascular: Reviewed in HPI  · Respiratory: No cough or wheezing, no sputum production. · Gastrointestinal: No abdominal pain, no constipation or diarrhea  · Genitourinary: No dysuria, trouble voiding, or hematuria. · Musculoskeletal:  No weakness or joint complaints. · Integumentary: No rash or pruritis. · Neurological: No numbness or tingling. No weakness. No tremor. · Psychiatric: No anxiety, no depression. · Endocrine:  No excessive thirst or urination. · Hematologic/Lymphatic: No abnormal bruising or bleeding, blood clots or swollen lymph nodes.     Physical Examination:    Vitals:    12/27/18 1344 12/27/18 1349   BP: (!) 88/58 (!) 88/58   Pulse: 61    SpO2: 96%    Weight: 125 lb 6.4 oz (56.9 kg)

## 2019-01-16 ENCOUNTER — OFFICE VISIT (OUTPATIENT)
Dept: CARDIOLOGY CLINIC | Age: 34
End: 2019-01-16

## 2019-01-16 ENCOUNTER — HOSPITAL ENCOUNTER (OUTPATIENT)
Age: 34
Discharge: HOME | End: 2019-01-16
Payer: MEDICAID

## 2019-01-16 VITALS
DIASTOLIC BLOOD PRESSURE: 62 MMHG | HEART RATE: 60 BPM | WEIGHT: 127.6 LBS | SYSTOLIC BLOOD PRESSURE: 90 MMHG | BODY MASS INDEX: 22.61 KG/M2 | OXYGEN SATURATION: 98 % | HEIGHT: 63 IN

## 2019-01-16 DIAGNOSIS — I50.22 CHRONIC SYSTOLIC HEART FAILURE (HCC): ICD-10-CM

## 2019-01-16 DIAGNOSIS — I47.29 NSVT (NONSUSTAINED VENTRICULAR TACHYCARDIA): ICD-10-CM

## 2019-01-16 DIAGNOSIS — I42.8 NONISCHEMIC CARDIOMYOPATHY (HCC): ICD-10-CM

## 2019-01-16 DIAGNOSIS — I42.8 NONISCHEMIC CARDIOMYOPATHY (HCC): Primary | ICD-10-CM

## 2019-01-16 LAB
ANION GAP SERPL CALCULATED.3IONS-SCNC: 11 MMOL/L (ref 3–16)
BUN BLDV-MCNC: 12 MG/DL (ref 7–20)
CALCIUM SERPL-MCNC: 8.8 MG/DL (ref 8.3–10.6)
CHLORIDE BLD-SCNC: 101 MMOL/L (ref 99–110)
CO2: 25 MMOL/L (ref 21–32)
CREAT SERPL-MCNC: 0.6 MG/DL (ref 0.6–1.1)
GFR AFRICAN AMERICAN: >60
GFR NON-AFRICAN AMERICAN: >60
GLUCOSE BLD-MCNC: 81 MG/DL (ref 70–99)
POTASSIUM SERPL-SCNC: 4.8 MMOL/L (ref 3.5–5.1)
PRO-BNP: 921 PG/ML (ref 0–124)
SODIUM BLD-SCNC: 137 MMOL/L (ref 136–145)

## 2019-01-16 PROCEDURE — 99214 OFFICE O/P EST MOD 30 MIN: CPT | Performed by: NURSE PRACTITIONER

## 2019-01-16 PROCEDURE — 83880 ASSAY OF NATRIURETIC PEPTIDE: CPT

## 2019-01-16 PROCEDURE — 80048 BASIC METABOLIC PNL TOTAL CA: CPT

## 2019-01-16 PROCEDURE — 36415 COLL VENOUS BLD VENIPUNCTURE: CPT

## 2019-01-16 RX ORDER — LISINOPRIL 5 MG/1
10 TABLET ORAL DAILY
Qty: 60 TABLET | Refills: 3
Start: 2019-01-16 | End: 2019-01-22 | Stop reason: CLARIF

## 2019-01-17 ENCOUNTER — TELEPHONE (OUTPATIENT)
Dept: CARDIOLOGY CLINIC | Age: 34
End: 2019-01-17

## 2019-01-22 ENCOUNTER — OFFICE VISIT (OUTPATIENT)
Dept: CARDIOLOGY CLINIC | Age: 34
End: 2019-01-22
Payer: COMMERCIAL

## 2019-01-22 VITALS
SYSTOLIC BLOOD PRESSURE: 109 MMHG | OXYGEN SATURATION: 96 % | BODY MASS INDEX: 22.32 KG/M2 | WEIGHT: 126 LBS | HEIGHT: 63 IN | HEART RATE: 71 BPM | DIASTOLIC BLOOD PRESSURE: 67 MMHG

## 2019-01-22 DIAGNOSIS — I42.8 NONISCHEMIC CARDIOMYOPATHY (HCC): ICD-10-CM

## 2019-01-22 DIAGNOSIS — R06.02 SHORTNESS OF BREATH: ICD-10-CM

## 2019-01-22 DIAGNOSIS — I50.22 CHRONIC SYSTOLIC HEART FAILURE (HCC): Primary | ICD-10-CM

## 2019-01-22 PROCEDURE — 99214 OFFICE O/P EST MOD 30 MIN: CPT | Performed by: INTERNAL MEDICINE

## 2019-02-04 DIAGNOSIS — I50.22 CHRONIC SYSTOLIC HEART FAILURE (HCC): ICD-10-CM

## 2019-02-20 ENCOUNTER — OFFICE VISIT (OUTPATIENT)
Dept: CARDIOLOGY CLINIC | Age: 34
End: 2019-02-20
Payer: COMMERCIAL

## 2019-02-20 VITALS
SYSTOLIC BLOOD PRESSURE: 90 MMHG | BODY MASS INDEX: 22.68 KG/M2 | DIASTOLIC BLOOD PRESSURE: 64 MMHG | HEART RATE: 84 BPM | WEIGHT: 128 LBS | HEIGHT: 63 IN | OXYGEN SATURATION: 98 %

## 2019-02-20 DIAGNOSIS — R06.02 SHORTNESS OF BREATH: ICD-10-CM

## 2019-02-20 DIAGNOSIS — I42.8 NONISCHEMIC CARDIOMYOPATHY (HCC): ICD-10-CM

## 2019-02-20 DIAGNOSIS — I47.29 NSVT (NONSUSTAINED VENTRICULAR TACHYCARDIA): ICD-10-CM

## 2019-02-20 DIAGNOSIS — I50.22 CHRONIC SYSTOLIC HEART FAILURE (HCC): Primary | ICD-10-CM

## 2019-02-20 PROCEDURE — 99214 OFFICE O/P EST MOD 30 MIN: CPT | Performed by: NURSE PRACTITIONER

## 2019-04-04 ENCOUNTER — OFFICE VISIT (OUTPATIENT)
Dept: CARDIOLOGY CLINIC | Age: 34
End: 2019-04-04
Payer: COMMERCIAL

## 2019-04-04 ENCOUNTER — HOSPITAL ENCOUNTER (OUTPATIENT)
Age: 34
Discharge: HOME | End: 2019-04-04
Payer: COMMERCIAL

## 2019-04-04 VITALS
WEIGHT: 133 LBS | DIASTOLIC BLOOD PRESSURE: 50 MMHG | HEIGHT: 63 IN | HEART RATE: 63 BPM | BODY MASS INDEX: 23.57 KG/M2 | OXYGEN SATURATION: 98 % | SYSTOLIC BLOOD PRESSURE: 90 MMHG

## 2019-04-04 DIAGNOSIS — I47.29 NSVT (NONSUSTAINED VENTRICULAR TACHYCARDIA): ICD-10-CM

## 2019-04-04 DIAGNOSIS — R06.02 SHORTNESS OF BREATH: ICD-10-CM

## 2019-04-04 DIAGNOSIS — I50.22 CHRONIC SYSTOLIC HEART FAILURE (HCC): ICD-10-CM

## 2019-04-04 DIAGNOSIS — I42.8 NONISCHEMIC CARDIOMYOPATHY (HCC): ICD-10-CM

## 2019-04-04 DIAGNOSIS — I50.22 CHRONIC SYSTOLIC HEART FAILURE (HCC): Primary | ICD-10-CM

## 2019-04-04 LAB
CREAT SERPL-MCNC: 0.6 MG/DL (ref 0.6–1.1)
GFR AFRICAN AMERICAN: >60
GFR NON-AFRICAN AMERICAN: >60
POTASSIUM SERPL-SCNC: 4.8 MMOL/L (ref 3.5–5.1)

## 2019-04-04 PROCEDURE — 84132 ASSAY OF SERUM POTASSIUM: CPT

## 2019-04-04 PROCEDURE — 82565 ASSAY OF CREATININE: CPT

## 2019-04-04 PROCEDURE — 36415 COLL VENOUS BLD VENIPUNCTURE: CPT

## 2019-04-04 PROCEDURE — 99214 OFFICE O/P EST MOD 30 MIN: CPT | Performed by: NURSE PRACTITIONER

## 2019-04-04 RX ORDER — METOPROLOL SUCCINATE 50 MG/1
50 TABLET, EXTENDED RELEASE ORAL DAILY
Qty: 90 TABLET | Refills: 1 | Status: SHIPPED | OUTPATIENT
Start: 2019-04-04 | End: 2019-04-15 | Stop reason: SDUPTHER

## 2019-04-04 RX ORDER — SPIRONOLACTONE 25 MG/1
25 TABLET ORAL DAILY
Qty: 90 TABLET | Refills: 2 | Status: SHIPPED | OUTPATIENT
Start: 2019-04-04 | End: 2020-01-08

## 2019-04-04 RX ORDER — DIGOXIN 125 MCG
125 TABLET ORAL DAILY
Qty: 90 TABLET | Refills: 3 | Status: SHIPPED | OUTPATIENT
Start: 2019-04-04 | End: 2020-01-29 | Stop reason: SDUPTHER

## 2019-04-04 NOTE — LETTER
[facility], operated by [facility]   Cardiac Follow-up    Primary Care Doctor:  Sheila Hernandez MD    Chief Complaint   Patient presents with    Follow-up        History of Present Illness:   I had the pleasure of seeing Delmis Echols in follow up for cardiomyopathy. Admitted from 11/30/18-12/5/18 for shortness of breath. LVEF 16% on echo. Cardiac cath was negative. Started and discharged with Toprol, lisinopril, digoxin, Lasix, and spironolactone. Stopped Adderall due to NSVT seen on EKG. Since last visit, she has missed a few appointments. She has been taking both toprol in the morning 50mg of the toprol and denies any lightheadedness or dizziness. She feels great. She is working 4-5 hours at a time, feels good. No shortness of breath. Home  B/p machine is broken. Still wearing life vest.   No lasix at all. Saraha Phlegladis denies chest pain, dyspnea, palpitations, orthopnea, fatigue, early saiety, edema, syncope. Past Medical History:   has a past medical history of Anxiety, Attention deficit disorder (ADD) without hyperactivity, Bipolar 2 disorder ([facility] [address] Congestive heart failure of unknown etiology ([facility] [address] and Substance abuse ([facility] [address] Surgical History:   has a past surgical history that includes Appendectomy. Social History:   reports that she has never smoked. She has never used smokeless tobacco. She reports that she does not drink alcohol or use drugs. Family History:   No family history on file. Home Medications:  Prior to Admission medications    Medication Sig Start Date End Date Taking?  Authorizing Provider   metoprolol succinate (TOPROL XL) 25 MG extended release tablet TAKE ONE TABLET BY MOUTH TWICE A DAY 3/15/19   DANIEL Livingston - CNP   sacubitril-valsartan (ENTRESTO) 24-26 MG per tablet Take 1 tablet by mouth 2 times daily 2/4/19   Sharmaine Freire MD   Blood Pressure Monitor KIT 1 Device by Does not apply route See Admin Instructions 12/27/18   Jeanna Habermann, APRN - CNP   furosemide (LASIX) 20 MG tablet Take 1 tablet by mouth See Admin Instructions As needed for weight gain 3lbs in a day or 5lbs in a week 12/11/18   Jeanna Habermann, APRN - CNP   spironolactone (ALDACTONE) 25 MG tablet Take 1 tablet by mouth daily 12/6/18   Emerson Kurt, DO   digoxin [facility] TRANSPL[facility]) 125 MCG tablet Take 1 tablet by mouth daily 12/6/18   Emerson Kurt, DO   Norgestimate-Eth Estradiol (SPRINTEC 28 PO) Take by mouth    Historical Provider, MD      Allergies:  Seroquel [quetiapine fumarate]     Review of Systems:   · Constitutional: there has been no unanticipated weight loss. · Eyes: No vision changes  · ENT: No Headaches, no nasal congestion. No mouth sores or sore throat. · Cardiovascular: Reviewed in HPI  · Respiratory: No cough or wheezing, no sputum production. · Gastrointestinal: No abdominal pain, no constipation or diarrhea  · Genitourinary: No dysuria, trouble voiding, or hematuria. · Musculoskeletal:  No weakness or joint complaints. · Integumentary: No rash or pruritis. · Neurological: No numbness or tingling. No weakness. No tremor. · Psychiatric: No anxiety, no depression. · Endocrine:  No excessive thirst or urination. · Hematologic/Lymphatic: No abnormal bruising or bleeding, blood clots or swollen lymph nodes.     Physical Examination:    Vitals:    04/04/19 1408 04/04/19 1413   BP: (!) 90/50 (!) 90/50   Pulse: 63    SpO2: 98%    Weight: 133 lb (60.3 kg)    Height: 5' 3\" (1.6 m)           Constitutional and General Appearance: no apparent distress  HEENT: non-icteric sclera, oropharynx without exudate, oral mucosa moist  Neck: JVP less than 8 cm H20  Respiratory:  · No use of accessory muscles  · Clear breath sounds throughout, no wheezing, no crackles, no rhonchi  Cardiovascular:  · The apical impulses not displaced; life vest in place  · Heart tones are crisp and normal, no murmur/rub/gallop · Regular rate and rhythm, S1,S2 normal  · Radial pulses 2+ and equal bilaterally  · No edema  · Pedal Pulses: 2+ and equal   Abdomen:  · No masses or tenderness  · Liver: No Abnormalities Noted  Musculoskeletal/Skin:  · Exhibits normal gait balance and coordination  · There is no clubbing, cyanosis of the extremities  · Skin is warm and dry  · Moves all extremities well  Neurological/Psychiatric:  · Alert and oriented in all spheres  · No abnormalities of mood, affect, memory, mentation, or behavior are noted      Lab Data:  CBC:   Lab Results   Component Value Date    WBC 7.5 12/05/2018    WBC 7.0 12/04/2018    WBC 7.5 12/03/2018    RBC 4.43 12/05/2018    RBC 4.71 12/04/2018    RBC 4.21 12/03/2018    HGB 14.4 12/05/2018    HGB 15.3 12/04/2018    HGB 13.6 12/03/2018    HCT 41.9 12/05/2018    HCT 45.0 12/04/2018    HCT 40.3 12/03/2018    MCV 94.5 12/05/2018    MCV 95.4 12/04/2018    MCV 95.7 12/03/2018    RDW 12.4 12/05/2018    RDW 12.3 12/04/2018    RDW 12.6 12/03/2018     12/05/2018     12/04/2018     12/03/2018     Iron:   Lab Results   Component Value Date    IRON 51 11/30/2018    TIBC 453 (H) 11/30/2018    FERRITIN 39.3 11/30/2018     BMP:   Lab Results   Component Value Date     01/16/2019     12/17/2018     12/05/2018    K 4.8 01/16/2019    K 4.3 12/17/2018    K 4.7 12/05/2018     01/16/2019     12/17/2018     12/05/2018    CO2 25 01/16/2019    CO2 27 12/17/2018    CO2 25 12/05/2018    BUN 12 01/16/2019    BUN 11 12/17/2018    BUN 10 12/05/2018    CREATININE 0.6 01/16/2019    CREATININE 0.7 12/17/2018    CREATININE 0.7 12/05/2018     BNP:   Lab Results   Component Value Date    PROBNP 921 01/16/2019    PROBNP 3,208 12/17/2018    PROBNP 3,929 12/04/2018     Lipids: No results found for: CHOL   No results found for: TRIG   No results found for: HDL   No results found for: LDLCHOLESTEROL, LDLCALC   No results found for: LABVLDL, VLDL No results found for: CHOLHDLRATIO    EF: No results found for: LVEF, LVEFMODE    Recent Testing:  Echo 11/13/18   The left ventricular systolic function is severely reduced with an ejection   fraction of 15-20 %.   EF by Kay's method estimated at 16%.   Upper limits of normal left ventricle size.   Severe global hypokinesis. Paradoxical septal motion. No intracardiac   thrombus.   Elevated left ventricular diastolic filling pressure.   The right ventricle is normal in size with decreased function.   The left atrium is moderately dilated.   The right atrium is mildly dilated.   Color flow demonstrates eccentric jet suggesting moderate mitral   regurgitation.   Mild pulmonic regurgitation.   Moderate tricuspid regurgitation.   Systolic pulmonary artery pressure (SPAP) is normal and estimated at 29 mmHg   (right atrial pressure 8 mmHg). Cardiac cath:  12/3/18:  Normal coronary arteries   Right atrial pressure  of 4, RV pressure of 24/6, PA pressure of 24/11 with a mean of 18. Pulmonary artery wedge pressure of 11.  Cardiac output of 3.11, index of  1.94, SVR is 1903, PVR is 200.  PA saturation is 57%.  Right atrial  saturation is 61% and RV saturation 59%.  Aortic saturation 98%.     NYHA:   I  ACC/ AHA Stage:    C    Pertinent Problems:  · Non-ischemic cardiomyopathy. LVEF 00%  · Systolic heart failure   · Hx of polysubstance abuse  · Hx of ADD previously on adderall    Visit Diagnosis:    1. Chronic systolic heart failure ([address]    2. Nonischemic cardiomyopathy ([address]    3. NSVT (nonsustained ventricular tachycardia) ([address]        Plan:     1. Continue toprol 50mg daily   2. Check labs- will call with results if potassium kidney function are normal then can expect to increase the Entresto to 1 tab in the morning and 2 tabs in the evening. Then will plan to recheck labs in about 7-10 days after you increased. If the potassium and kidney numbers are abnormal, will call you with a plan.   Continue life vest Follow up 4-6 weeks with Dr. Antonio Salazar with same day echo     QUALITY MEASURES  1. Tobacco Cessation Counseling: NA  2. Retake of BP if >140/90:   NA  3. Documentation to PCP/referring for new patient:  Sent to PCP at close of office visit  4. CAD patient on anti-platelet: NA  5. CAD patient on STATIN therapy:  NA  6. Patient with CHF and aFib on anticoagulation:  NA     I appreciate the opportunity for caring for this patient.      Mayra Tidwell CNP, 4/4/2019, 4:57 PM

## 2019-04-04 NOTE — PROGRESS NOTES
[facility]   Cardiac Follow-up    Primary Care Doctor:  Vikram Michelle MD    Chief Complaint   Patient presents with    Follow-up        History of Present Illness:   I had the pleasure of seeing Jaspreet Morris in follow up for cardiomyopathy. Admitted from 11/30/18-12/5/18 for shortness of breath. LVEF 16% on echo. Cardiac cath was negative. Started and discharged with Toprol, lisinopril, digoxin, Lasix, and spironolactone. Stopped Adderall due to NSVT seen on EKG. Since last visit, she has missed a few appointments. She has been taking both toprol in the morning 50mg of the toprol and denies any lightheadedness or dizziness. She feels great. She is working 4-5 hours at a time, feels good. No shortness of breath. Home  B/p machine is broken. Still wearing life vest.   No lasix at all. Jaspreet Morris denies chest pain, dyspnea, palpitations, orthopnea, fatigue, early saiety, edema, syncope. Past Medical History:   has a past medical history of Anxiety, Attention deficit disorder (ADD) without hyperactivity, Bipolar 2 disorder ([facility] [address] Congestive heart failure of unknown etiology ([facility] [address] and Substance abuse ([facility] [address] Surgical History:   has a past surgical history that includes Appendectomy. Social History:   reports that she has never smoked. She has never used smokeless tobacco. She reports that she does not drink alcohol or use drugs. Family History:   No family history on file. Home Medications:  Prior to Admission medications    Medication Sig Start Date End Date Taking?  Authorizing Provider   metoprolol succinate (TOPROL XL) 25 MG extended release tablet TAKE ONE TABLET BY MOUTH TWICE A DAY 3/15/19   Clinton Degree, APRN - CNP   sacubitril-valsartan (ENTRESTO) 24-26 MG per tablet Take 1 tablet by mouth 2 times daily 2/4/19   Jodi Weiss MD   Blood Pressure Monitor KIT 1 Device by Does not apply route See Admin Instructions 12/27/18   Clinton De La Vega, APRN - CNP bilaterally  · No edema  · Pedal Pulses: 2+ and equal   Abdomen:  · No masses or tenderness  · Liver: No Abnormalities Noted  Musculoskeletal/Skin:  · Exhibits normal gait balance and coordination  · There is no clubbing, cyanosis of the extremities  · Skin is warm and dry  · Moves all extremities well  Neurological/Psychiatric:  · Alert and oriented in all spheres  · No abnormalities of mood, affect, memory, mentation, or behavior are noted      Lab Data:  CBC:   Lab Results   Component Value Date    WBC 7.5 12/05/2018    WBC 7.0 12/04/2018    WBC 7.5 12/03/2018    RBC 4.43 12/05/2018    RBC 4.71 12/04/2018    RBC 4.21 12/03/2018    HGB 14.4 12/05/2018    HGB 15.3 12/04/2018    HGB 13.6 12/03/2018    HCT 41.9 12/05/2018    HCT 45.0 12/04/2018    HCT 40.3 12/03/2018    MCV 94.5 12/05/2018    MCV 95.4 12/04/2018    MCV 95.7 12/03/2018    RDW 12.4 12/05/2018    RDW 12.3 12/04/2018    RDW 12.6 12/03/2018     12/05/2018     12/04/2018     12/03/2018     Iron:   Lab Results   Component Value Date    IRON 51 11/30/2018    TIBC 453 (H) 11/30/2018    FERRITIN 39.3 11/30/2018     BMP:   Lab Results   Component Value Date     01/16/2019     12/17/2018     12/05/2018    K 4.8 01/16/2019    K 4.3 12/17/2018    K 4.7 12/05/2018     01/16/2019     12/17/2018     12/05/2018    CO2 25 01/16/2019    CO2 27 12/17/2018    CO2 25 12/05/2018    BUN 12 01/16/2019    BUN 11 12/17/2018    BUN 10 12/05/2018    CREATININE 0.6 01/16/2019    CREATININE 0.7 12/17/2018    CREATININE 0.7 12/05/2018     BNP:   Lab Results   Component Value Date    PROBNP 921 01/16/2019    PROBNP 3,208 12/17/2018    PROBNP 3,929 12/04/2018     Lipids: No results found for: CHOL   No results found for: TRIG   No results found for: HDL   No results found for: LDLCHOLESTEROL, LDLCALC   No results found for: LABVLDL, VLDL   No results found for: CHOLHDLRATIO    EF: No results found for: LVEF, LVEFMODE    Recent Testing:  Echo 11/13/18   The left ventricular systolic function is severely reduced with an ejection   fraction of 15-20 %.   EF by Kay's method estimated at 16%.   Upper limits of normal left ventricle size.   Severe global hypokinesis. Paradoxical septal motion. No intracardiac   thrombus.   Elevated left ventricular diastolic filling pressure.   The right ventricle is normal in size with decreased function.   The left atrium is moderately dilated.   The right atrium is mildly dilated.   Color flow demonstrates eccentric jet suggesting moderate mitral   regurgitation.   Mild pulmonic regurgitation.   Moderate tricuspid regurgitation.   Systolic pulmonary artery pressure (SPAP) is normal and estimated at 29 mmHg   (right atrial pressure 8 mmHg). Cardiac cath:  12/3/18:  Normal coronary arteries   Right atrial pressure  of 4, RV pressure of 24/6, PA pressure of 24/11 with a mean of 18. Pulmonary artery wedge pressure of 11.  Cardiac output of 3.11, index of  1.94, SVR is 1903, PVR is 200.  PA saturation is 57%.  Right atrial  saturation is 61% and RV saturation 59%.  Aortic saturation 98%.     NYHA:   I  ACC/ AHA Stage:    C    Pertinent Problems:  · Non-ischemic cardiomyopathy. LVEF 91%  · Systolic heart failure   · Hx of polysubstance abuse  · Hx of ADD previously on adderall    Visit Diagnosis:    1. Chronic systolic heart failure ([address]    2. Nonischemic cardiomyopathy ([address]    3. NSVT (nonsustained ventricular tachycardia) ([address]        Plan:     1. Continue toprol 50mg daily   2. Check labs- will call with results if potassium kidney function are normal then can expect to increase the Entresto to 1 tab in the morning and 2 tabs in the evening. Then will plan to recheck labs in about 7-10 days after you increased. If the potassium and kidney numbers are abnormal, will call you with a plan. Continue life vest  Follow up 4-6 weeks with Dr. Rajwinder Rodriguez with same day echo     QUALITY MEASURES  1.  Tobacco Cessation Counseling: NA  2. Retake of BP if >140/90:   NA  3. Documentation to PCP/referring for new patient:  Sent to PCP at close of office visit  4. CAD patient on anti-platelet: NA  5. CAD patient on STATIN therapy:  NA  6. Patient with CHF and aFib on anticoagulation:  NA     I appreciate the opportunity for caring for this patient.      Bridger Warren CNP, 4/4/2019, 4:57 PM

## 2019-04-05 ENCOUNTER — TELEPHONE (OUTPATIENT)
Dept: CARDIOLOGY CLINIC | Age: 34
End: 2019-04-05

## 2019-04-05 NOTE — TELEPHONE ENCOUNTER
----- Message from DANIEL Pack CNP sent at 4/5/2019  8:11 AM EDT -----  Kidneys and potassium normal. [place] to increase the Entresto to 1 tab in the morning and 2 tabs in the evening of the 24-26mg dose.  Then will plan to recheck labs in about 7-10 days after you increase the dose. (she has lab orders already)

## 2019-05-06 NOTE — PROGRESS NOTES
[facility]  Advanced CHF/Pulmonary Hypertension   Cardiac Evaluation      Gabriella Urbina  YOB: 1985    Date of Visit:  5/7/19      Chief Complaint   Patient presents with    1 Month Follow-Up    Congestive Heart Failure    Cardiomyopathy          History of Present Illness:  Gabriella Urbina is a 35 y.o. female who presents for follow up for management of cardiomyopathy. She was admitted from 11/30/18-12/5/18 for shortness of breath. Her echo from 11/30/18 showed her LVEF was 16%. Her cardiac cath showed normal coronaries with an EF of less than 30%. She was discharged with Toprol, lisinopril, digoxin, Lasix, and spironolactone. She stopped Adderall due to NSVT seen on EKG. She reports she had severe SOB starting 11/2018. Her weight is down 10 lbs. She denies CP, SOB, dizziness or syncope. She denies waking up gasping for air which is a substantial improvement. She has not needed the lasix since discharge. Her BNP on 12/04/18 was 3929 and was down to 921 on 01/16/19. We did a \"quick look\" echo 01/21/19 that shows an improvement in LVEF to 25%. She does not have insurance currently. Will cancel echo next week, continue lifeVest, and continue to optimize meds. She saw Ya Marquez in 04/2019 who increased her Entresto to 24-26 mg in am and 48-51 mg in pm.   Today her limited echo today showed her EF was 35-40%. She reports she feels well overall. She denies CP, SOB, dizziness or syncope. She is taking entresto 1 in the am and 2 in the pm.       Allergies   Allergen Reactions    Seroquel [Quetiapine Fumarate]      Too tired and groggy.      Current Outpatient Medications   Medication Sig Dispense Refill    metoprolol succinate (TOPROL XL) 50 MG extended release tablet Take 1 tablet by mouth daily 90 tablet 1    digoxin (LANOXIN) 125 MCG tablet Take 1 tablet by mouth daily 90 tablet 3    spironolactone (ALDACTONE) 25 MG tablet Take 1 tablet by mouth daily 90 tablet 2    Relationship status: Not on file    Intimate partner violence:     Fear of current or ex partner: Not on file     Emotionally abused: Not on file     Physically abused: Not on file     Forced sexual activity: Not on file   Other Topics Concern    Not on file   Social History Narrative    Not on file       Review of Systems:   · Constitutional: there has been no unanticipated weight loss. There's been no change in energy level, sleep pattern, or activity level. · Eyes: No visual changes or diplopia. No scleral icterus. · ENT: No Headaches, hearing loss or vertigo. No mouth sores or sore throat. · Cardiovascular: Reviewed in HPI  · Respiratory: No cough or wheezing, no sputum production. No hematemesis. · Gastrointestinal: No abdominal pain, appetite loss, blood in stools. No change in bowel or bladder habits. · Genitourinary: No dysuria, trouble voiding, or hematuria. · Musculoskeletal:  No gait disturbance, weakness or joint complaints. · Integumentary: No rash or pruritis. · Neurological: No headache, diplopia, change in muscle strength, numbness or tingling. No change in gait, balance, coordination, mood, affect, memory, mentation, behavior. · Psychiatric: No anxiety, no depression. · Endocrine: No malaise, fatigue or temperature intolerance. No excessive thirst, fluid intake, or urination. No tremor. · Hematologic/Lymphatic: No abnormal bruising or bleeding, blood clots or swollen lymph nodes. · Allergic/Immunologic: No nasal congestion or hives. Physical Examination:    Vitals:    05/07/19 1352   BP: 108/72   Pulse: 77   SpO2: 97%   Weight: 133 lb 3.2 oz (60.4 kg)   Height: 5' 3\" (1.6 m)     Body mass index is 23.6 kg/m².      Wt Readings from Last 3 Encounters:   05/07/19 133 lb 3.2 oz (60.4 kg)   04/04/19 133 lb (60.3 kg)   02/20/19 128 lb (58.1 kg)     BP Readings from Last 3 Encounters:   05/07/19 108/72   04/04/19 (!) 90/50   02/20/19 90/64          Constitutional and General Appearance:   WD/WN in NAD  HEENT:  NC/AT  YASMINE  No problems with hearing  Skin:  Warm, dry  Respiratory:  · Normal excursion and expansion without use of accessory muscles  · Resp Auscultation: Normal breath sounds without dullness  Cardiovascular:  · The apical impulses not displaced  · Heart tones are crisp and normal  · Cervical veins are not engorged  · The carotid upstroke is normal in amplitude and contour without delay or bruit  · JVP normal  RRR with nl S1 and S2 without m,r,g  · Peripheral pulses are symmetrical and full  · There is no clubbing, cyanosis of the extremities. · No edema  · Femoral Arteries: 2+ and equal  · Pedal Pulses: 2+ and equal   Neck:  · No thyromegaly  Abdomen:  · No masses or tenderness  · Liver/Spleen: No Abnormalities Noted  Neurological/Psychiatric:  · Alert and oriented in all spheres  · Moves all extremities well  · Exhibits normal gait balance and coordination  · No abnormalities of mood, affect, memory, mentation, or behavior are noted    Echo: 11/30/18   The left ventricular systolic function is severely reduced with an ejection   fraction of 15-20 %. EF by Kay's method estimated at 16%. Upper limits of normal left ventricle size. Severe global hypokinesis. Paradoxical septal motion. No intracardiac   thrombus. Elevated left ventricular diastolic filling pressure. The right ventricle is normal in size with decreased function. The left atrium is moderately dilated. The right atrium is mildly dilated. Color flow demonstrates eccentric jet suggesting moderate mitral   regurgitation. Mild pulmonic regurgitation. Moderate tricuspid regurgitation. Systolic pulmonary artery pressure (SPAP) is normal and estimated at 29 mmHg   (right atrial pressure 8 mmHg). Echo: 05/07/19  Limited echo for LV function. The left ventricular systolic function is moderately reduced with an   ejection fraction of 35-40 %. EF by Kay's method estimated at 31%. Moderate global hypokinesis. Normal left ventricular diastolic filling pressure. Mild to moderate (eccentric) mitral regurgitation. MIld tricuspid regurgitation. Systolic pulmonary artery pressure (SPAP) is normal and estimated at 20 mmHg   (right atrial pressure 3 mmHg). Compared to last echo on 11/30/2018 (EF 15-20%), left ventricle systolic   function has improved. The mitral and tricuspid regurgitation have decreased   in severity. Labs were reviewed including labs from other hospital systems through [facility]. Cardiac testing was reviewed including echos, nuclear scans, cardiac catheterization, including from other hospital systems through [facility]. \    Assessment:    1. Chronic systolic heart failure ([facility] [address]    2. Shortness of breath    3. Nonischemic cardiomyopathy ([facility] [address]           Plan:  1. Discontinue Life Vest  2. Stop metoprolol  3. Start carvedilol 12.5 mg twice daily   4. After 2 weeks on carvedilol, increase Entresto 49/51 mg twice daily  5. Check BP in 2 weeks or stop into the office for a BP check  6. In 2-3 months will get a quick look echo or muga  7. Follow up with Keyla Worthington in 1 month      QUALITY MEASURES  1. Tobacco Cessation Counseling: NA  2. Retake of BP if >140/90:   NA  3. Documentation to PCP/referring for new patient:  Sent to PCP at close of office visit  4. CAD patient on anti-platelet: NA  5. CAD patient on STATIN therapy:  NA  6. Patient with CHF and aFib on anticoagulation:  NA         I appreciate the opportunity of cooperating in the care of this patient. Scribe's attestation: This note was scribed in the presence of Shine Quesada M.D. By Barrett Marcos RN     The scribe's documentation has been prepared under my direction and personally reviewed by me in its entirety. I confirm that the note above accurately reflects all work, treatment, procedures, and medical decision making performed by me.       Shine Quesada M.D., [facility]

## 2019-05-07 ENCOUNTER — HOSPITAL ENCOUNTER (OUTPATIENT)
Dept: CARDIOLOGY | Age: 34
Discharge: HOME | End: 2019-05-07
Payer: COMMERCIAL

## 2019-05-07 ENCOUNTER — TELEPHONE (OUTPATIENT)
Dept: CARDIOLOGY CLINIC | Age: 34
End: 2019-05-07

## 2019-05-07 ENCOUNTER — HOSPITAL ENCOUNTER (OUTPATIENT)
Age: 34
Discharge: HOME | End: 2019-05-07
Payer: COMMERCIAL

## 2019-05-07 ENCOUNTER — OFFICE VISIT (OUTPATIENT)
Dept: CARDIOLOGY CLINIC | Age: 34
End: 2019-05-07
Payer: COMMERCIAL

## 2019-05-07 VITALS
HEART RATE: 77 BPM | DIASTOLIC BLOOD PRESSURE: 72 MMHG | SYSTOLIC BLOOD PRESSURE: 108 MMHG | BODY MASS INDEX: 23.6 KG/M2 | HEIGHT: 63 IN | WEIGHT: 133.2 LBS | OXYGEN SATURATION: 97 %

## 2019-05-07 DIAGNOSIS — I42.8 NONISCHEMIC CARDIOMYOPATHY (HCC): ICD-10-CM

## 2019-05-07 DIAGNOSIS — I50.22 CHRONIC SYSTOLIC HEART FAILURE (HCC): Primary | ICD-10-CM

## 2019-05-07 DIAGNOSIS — I50.22 CHRONIC SYSTOLIC HEART FAILURE (HCC): ICD-10-CM

## 2019-05-07 DIAGNOSIS — I47.29 NSVT (NONSUSTAINED VENTRICULAR TACHYCARDIA): ICD-10-CM

## 2019-05-07 DIAGNOSIS — R06.02 SHORTNESS OF BREATH: ICD-10-CM

## 2019-05-07 LAB
ANION GAP SERPL CALCULATED.3IONS-SCNC: 10 MMOL/L (ref 3–16)
BUN BLDV-MCNC: 11 MG/DL (ref 7–20)
CALCIUM SERPL-MCNC: 9.3 MG/DL (ref 8.3–10.6)
CHLORIDE BLD-SCNC: 98 MMOL/L (ref 99–110)
CO2: 29 MMOL/L (ref 21–32)
CREAT SERPL-MCNC: 0.6 MG/DL (ref 0.6–1.1)
GFR AFRICAN AMERICAN: >60
GFR NON-AFRICAN AMERICAN: >60
GLUCOSE BLD-MCNC: 89 MG/DL (ref 70–99)
POTASSIUM SERPL-SCNC: 4.8 MMOL/L (ref 3.5–5.1)
PRO-BNP: 290 PG/ML (ref 0–124)
SODIUM BLD-SCNC: 137 MMOL/L (ref 136–145)

## 2019-05-07 PROCEDURE — 99214 OFFICE O/P EST MOD 30 MIN: CPT | Performed by: INTERNAL MEDICINE

## 2019-05-07 PROCEDURE — 80048 BASIC METABOLIC PNL TOTAL CA: CPT

## 2019-05-07 PROCEDURE — 83880 ASSAY OF NATRIURETIC PEPTIDE: CPT

## 2019-05-07 PROCEDURE — 1036F TOBACCO NON-USER: CPT | Performed by: INTERNAL MEDICINE

## 2019-05-07 PROCEDURE — G8427 DOCREV CUR MEDS BY ELIG CLIN: HCPCS | Performed by: INTERNAL MEDICINE

## 2019-05-07 PROCEDURE — 93308 TTE F-UP OR LMTD: CPT

## 2019-05-07 PROCEDURE — 36415 COLL VENOUS BLD VENIPUNCTURE: CPT

## 2019-05-07 PROCEDURE — G8420 CALC BMI NORM PARAMETERS: HCPCS | Performed by: INTERNAL MEDICINE

## 2019-05-07 RX ORDER — CARVEDILOL 12.5 MG/1
12.5 TABLET ORAL 2 TIMES DAILY WITH MEALS
Qty: 180 TABLET | Refills: 11 | Status: SHIPPED | OUTPATIENT
Start: 2019-05-07 | End: 2020-01-29

## 2019-05-07 NOTE — TELEPHONE ENCOUNTER
----- Message from DANIEL Pack CNP sent at 5/7/2019  4:09 PM EDT -----  EF is improved!! To 35-40%.  Okay to d/c life vest!

## 2019-05-07 NOTE — LETTER
[address]  Advanced CHF/Pulmonary Hypertension   Cardiac Evaluation      Ana Paula Olson  YOB: 1985    Date of Visit:  5/7/19      Chief Complaint   Patient presents with    1 Month Follow-Up    Congestive Heart Failure    Cardiomyopathy          History of Present Illness:  Ana Paula Olson is a 35 y.o. female who presents for follow up for management of cardiomyopathy. She was admitted from 11/30/18-12/5/18 for shortness of breath. Her echo from 11/30/18 showed her LVEF was 16%. Her cardiac cath showed normal coronaries with an EF of less than 30%. She was discharged with Toprol, lisinopril, digoxin, Lasix, and spironolactone. She stopped Adderall due to NSVT seen on EKG. She reports she had severe SOB starting 11/2018. Her weight is down 10 lbs. She denies CP, SOB, dizziness or syncope. She denies waking up gasping for air which is a substantial improvement. She has not needed the lasix since discharge. Her BNP on 12/04/18 was 3929 and was down to 921 on 01/16/19. We did a \"quick look\" echo 01/21/19 that shows an improvement in LVEF to 25%. She does not have insurance currently. Will cancel echo next week, continue lifeVest, and continue to optimize meds. She saw Kiran Hall in 04/2019 who increased her Entresto to 24-26 mg in am and 48-51 mg in pm.   Today her limited echo today showed her EF was 35-40%. She reports she feels well overall. She denies CP, SOB, dizziness or syncope. She is taking entresto 1 in the am and 2 in the pm.       Allergies   Allergen Reactions    Seroquel [Quetiapine Fumarate]      Too tired and groggy.      Current Outpatient Medications   Medication Sig Dispense Refill    metoprolol succinate (TOPROL XL) 50 MG extended release tablet Take 1 tablet by mouth daily 90 tablet 1    digoxin (LANOXIN) 125 MCG tablet Take 1 tablet by mouth daily 90 tablet 3    spironolactone (ALDACTONE) 25 MG tablet Take 1 tablet by mouth daily 90 tablet 2  sacubitril-valsartan (ENTRESTO) 24-26 MG per tablet Take 1 tablet by mouth 2 times daily 60 tablet 5    Blood Pressure Monitor KIT 1 Device by Does not apply route See Admin Instructions 1 kit 0    furosemide (LASIX) 20 MG tablet Take 1 tablet by mouth See Admin Instructions As needed for weight gain 3lbs in a day or 5lbs in a week 60 tablet 3    Norgestimate-Eth Estradiol (SPRINTEC 28 PO) Take by mouth       No current facility-administered medications for this visit. Past Medical History:   Diagnosis Date    Anxiety 5/16/2011    Attention deficit disorder (ADD) without hyperactivity     Bipolar 2 disorder ([facility] [address] 4/27/2011    Congestive heart failure of unknown etiology ([facility] 11/30/2018    Substance abuse ([facility]     herion     Past Surgical History:   Procedure Laterality Date    APPENDECTOMY       History reviewed. No pertinent family history.   Social History     Socioeconomic History    Marital status: Single     Spouse name: Not on file    Number of children: Not on file    Years of education: Not on file    Highest education level: Not on file   Occupational History    Not on file   Social Needs    Financial resource strain: Not on file    Food insecurity:     Worry: Not on file     Inability: Not on file    Transportation needs:     Medical: Not on file     Non-medical: Not on file   Tobacco Use    Smoking status: Never Smoker    Smokeless tobacco: Never Used   Substance and Sexual Activity    Alcohol use: No    Drug use: No     Comment: Methadone, Herion 4years clean    Sexual activity: Yes   Lifestyle    Physical activity:     Days per week: Not on file     Minutes per session: Not on file    Stress: Not on file   Relationships    Social connections:     Talks on phone: Not on file     Gets together: Not on file     Attends [employer] service: Not on file     Active member of club or organization: Not on file     Attends meetings of clubs or organizations: Not on file Constitutional and General Appearance:   WD/WN in NAD  HEENT:  NC/AT  YASMINE  No problems with hearing  Skin:  Warm, dry  Respiratory:  · Normal excursion and expansion without use of accessory muscles  · Resp Auscultation: Normal breath sounds without dullness  Cardiovascular:  · The apical impulses not displaced  · Heart tones are crisp and normal  · Cervical veins are not engorged  · The carotid upstroke is normal in amplitude and contour without delay or bruit  · JVP normal  RRR with nl S1 and S2 without m,r,g  · Peripheral pulses are symmetrical and full  · There is no clubbing, cyanosis of the extremities. · No edema  · Femoral Arteries: 2+ and equal  · Pedal Pulses: 2+ and equal   Neck:  · No thyromegaly  Abdomen:  · No masses or tenderness  · Liver/Spleen: No Abnormalities Noted  Neurological/Psychiatric:  · Alert and oriented in all spheres  · Moves all extremities well  · Exhibits normal gait balance and coordination  · No abnormalities of mood, affect, memory, mentation, or behavior are noted    Echo: 11/30/18   The left ventricular systolic function is severely reduced with an ejection   fraction of 15-20 %. EF by Kay's method estimated at 16%. Upper limits of normal left ventricle size. Severe global hypokinesis. Paradoxical septal motion. No intracardiac   thrombus. Elevated left ventricular diastolic filling pressure. The right ventricle is normal in size with decreased function. The left atrium is moderately dilated. The right atrium is mildly dilated. Color flow demonstrates eccentric jet suggesting moderate mitral   regurgitation. Mild pulmonic regurgitation. Moderate tricuspid regurgitation. Systolic pulmonary artery pressure (SPAP) is normal and estimated at 29 mmHg   (right atrial pressure 8 mmHg). Echo: 05/07/19  Limited echo for LV function. The left ventricular systolic function is moderately reduced with an   ejection fraction of 35-40 %. EF by Kay's method estimated at 31%. Moderate global hypokinesis. Normal left ventricular diastolic filling pressure. Mild to moderate (eccentric) mitral regurgitation. MIld tricuspid regurgitation. Systolic pulmonary artery pressure (SPAP) is normal and estimated at 20 mmHg   (right atrial pressure 3 mmHg). Compared to last echo on 11/30/2018 (EF 15-20%), left ventricle systolic   function has improved. The mitral and tricuspid regurgitation have decreased   in severity. Labs were reviewed including labs from other hospital systems through [facility]. Cardiac testing was reviewed including echos, nuclear scans, cardiac catheterization, including from other hospital systems through [facility]. \    Assessment:    1. Chronic systolic heart failure ([facility] [address]    2. Shortness of breath    3. Nonischemic cardiomyopathy ([facility] [address]           Plan:  1. Discontinue Life Vest  2. Stop metoprolol  3. Start carvedilol 12.5 mg twice daily   4. After 2 weeks on carvedilol, increase Entresto 49/51 mg twice daily  5. Check BP in 2 weeks or stop into the office for a BP check  6. In 2-3 months will get a quick look echo or muga  7. Follow up with Jacky Marcos in 1 month      QUALITY MEASURES  1. Tobacco Cessation Counseling: NA  2. Retake of BP if >140/90:   NA  3. Documentation to PCP/referring for new patient:  Sent to PCP at close of office visit  4. CAD patient on anti-platelet: NA  5. CAD patient on STATIN therapy:  NA  6. Patient with CHF and aFib on anticoagulation:  NA         I appreciate the opportunity of cooperating in the care of this patient. Minnie's attestation: This note was scribed in the presence of Roxi Hummel M.D. By Rivera Wu RN     The minnie's documentation has been prepared under my direction and personally reviewed by me in its entirety. I confirm that the note above accurately reflects all work, treatment, procedures, and medical decision making performed by me.

## 2019-05-08 ENCOUNTER — TELEPHONE (OUTPATIENT)
Dept: CARDIOLOGY CLINIC | Age: 34
End: 2019-05-08

## 2019-05-08 NOTE — TELEPHONE ENCOUNTER
Attempted to contact pt, vm is not available to leave a message. Will try to contact at a later date and time and will relay NPRB message regarding labs once we get in contact with pt.

## 2019-05-08 NOTE — TELEPHONE ENCOUNTER
----- Message from Jeanna Habermann, APRN - CNP sent at 5/8/2019  8:58 AM EDT -----  Continue same plan as discussed at OV with ONEL. Labs look great!  Heart failure number much better, kidneys stable and potassium is normal!

## 2019-05-09 ENCOUNTER — TELEPHONE (OUTPATIENT)
Dept: CARDIOLOGY CLINIC | Age: 34
End: 2019-05-09

## 2019-07-10 ENCOUNTER — TELEPHONE (OUTPATIENT)
Dept: CARDIOLOGY CLINIC | Age: 34
End: 2019-07-10

## 2019-08-28 ENCOUNTER — TELEPHONE (OUTPATIENT)
Dept: CARDIOLOGY CLINIC | Age: 34
End: 2019-08-28

## 2019-08-30 NOTE — PROGRESS NOTES
[facility]  Advanced CHF/Pulmonary Hypertension   Cardiac Evaluation      Seferino Everett  YOB: 1985    Date of Visit:  9/3/19      Chief Complaint   Patient presents with    3 Month Follow-Up    Congestive Heart Failure         History of Present Illness:  Seferino Everett is a 29 y.o. female who presents for follow up for management of cardiomyopathy. She was admitted from 11/30/18-12/5/18 for shortness of breath. Her echo from 11/30/18 showed her LVEF was 16%. Her cardiac cath showed normal coronaries with an EF of less than 30%. She was discharged with Toprol, lisinopril, digoxin, Lasix, and spironolactone. She stopped Adderall due to NSVT seen on EKG. She reports she had severe SOB starting 11/2018. Her weight is down 10 lbs. She denies CP, SOB, dizziness or syncope. She denies waking up gasping for air which is a substantial improvement. She has not needed the lasix since discharge. Her BNP on 12/04/18 was 3929 and was down to 921 on 01/16/19. We did a \"quick look\" echo 01/21/19 that shows an improvement in LVEF to 25%. She does not have insurance currently. Will cancel echo next week, continue lifeVest, and continue to optimize meds. She saw Ani Fernandez in 04/2019 who increased her Entresto to 24-26 mg in am and 48-51 mg in pm.   Her echo from 05/07/19 showed her EF was 35-40%. Her Life Vest was discontinued 05/07/19. Today she reports she feels well overall. She denies CP, SOB, dizziness or syncope. She has not needed her diuretic. Allergies   Allergen Reactions    Seroquel [Quetiapine Fumarate]      Too tired and groggy.      Current Outpatient Medications   Medication Sig Dispense Refill    sacubitril-valsartan (ENTRESTO) 49-51 MG per tablet Take 1 tablet by mouth 2 times daily 180 tablet 3    carvedilol (COREG) 12.5 MG tablet Take 1 tablet by mouth 2 times daily (with meals) 180 tablet 11    digoxin (LANOXIN) 125 MCG tablet Take 1 tablet by mouth daily 90 tablet 3    spironolactone (ALDACTONE) 25 MG tablet Take 1 tablet by mouth daily 90 tablet 2    Blood Pressure Monitor KIT 1 Device by Does not apply route See Admin Instructions 1 kit 0    furosemide (LASIX) 20 MG tablet Take 1 tablet by mouth See Admin Instructions As needed for weight gain 3lbs in a day or 5lbs in a week 60 tablet 3    Norgestimate-Eth Estradiol (SPRINTEC 28 PO) Take by mouth       No current facility-administered medications for this visit. Past Medical History:   Diagnosis Date    Anxiety 5/16/2011    Attention deficit disorder (ADD) without hyperactivity     Bipolar 2 disorder ([facility] 4/27/2011    Congestive heart failure of unknown etiology ([facility] 11/30/2018    Substance abuse ([facility]     herion     Past Surgical History:   Procedure Laterality Date    APPENDECTOMY       History reviewed. No pertinent family history.   Social History     Socioeconomic History    Marital status: Single     Spouse name: Not on file    Number of children: Not on file    Years of education: Not on file    Highest education level: Not on file   Occupational History    Not on file   Social Needs    Financial resource strain: Not on file    Food insecurity:     Worry: Not on file     Inability: Not on file    Transportation needs:     Medical: Not on file     Non-medical: Not on file   Tobacco Use    Smoking status: Never Smoker    Smokeless tobacco: Never Used   Substance and Sexual Activity    Alcohol use: No    Drug use: No     Comment: Methadone, Herion 4years clean    Sexual activity: Yes   Lifestyle    Physical activity:     Days per week: Not on file     Minutes per session: Not on file    Stress: Not on file   Relationships    Social connections:     Talks on phone: Not on file     Gets together: Not on file     Attends Rastafarian service: Not on file     Active member of club or organization: Not on file     Attends meetings of clubs or organizations: Not on file 31%.   Moderate global hypokinesis. Normal left ventricular diastolic filling pressure. Mild to moderate (eccentric) mitral regurgitation. MIld tricuspid regurgitation. Systolic pulmonary artery pressure (SPAP) is normal and estimated at 20 mmHg   (right atrial pressure 3 mmHg). Compared to last echo on 11/30/2018 (EF 15-20%), left ventricle systolic   function has improved. The mitral and tricuspid regurgitation have decreased   in severity. Labs were reviewed including labs from other hospital systems through [facility]. Cardiac testing was reviewed including echos, nuclear scans, cardiac catheterization, including from other hospital systems through [facility]. \    Assessment:    1. Chronic systolic heart failure ([facility] [address]    2. Shortness of breath    3. Nonischemic cardiomyopathy ([facility] [address]         Plan:  1. Labs today   2. Follow up in 4 months      QUALITY MEASURES  1. Tobacco Cessation Counseling: NA  2. Retake of BP if >140/90:   NA  3. Documentation to PCP/referring for new patient:  Sent to PCP at close of office visit  4. CAD patient on anti-platelet: NA  5. CAD patient on STATIN therapy:  NA  6. Patient with CHF and aFib on anticoagulation:  NA         I appreciate the opportunity of cooperating in the care of this patient. Scribe's attestation: This note was scribed in the presence of Chino Blunt M.D. By Katt Eddy RN     The scribe's documentation has been prepared under my direction and personally reviewed by me in its entirety. I confirm that the note above accurately reflects all work, treatment, procedures, and medical decision making performed by me.         Chino Blunt M.D., [facility]

## 2019-09-03 ENCOUNTER — OFFICE VISIT (OUTPATIENT)
Dept: CARDIOLOGY CLINIC | Age: 34
End: 2019-09-03
Payer: COMMERCIAL

## 2019-09-03 VITALS
SYSTOLIC BLOOD PRESSURE: 88 MMHG | HEIGHT: 63 IN | DIASTOLIC BLOOD PRESSURE: 64 MMHG | WEIGHT: 137 LBS | HEART RATE: 73 BPM | OXYGEN SATURATION: 95 % | BODY MASS INDEX: 24.27 KG/M2

## 2019-09-03 DIAGNOSIS — I50.22 CHRONIC SYSTOLIC HEART FAILURE (HCC): Primary | ICD-10-CM

## 2019-09-03 DIAGNOSIS — R06.02 SHORTNESS OF BREATH: ICD-10-CM

## 2019-09-03 DIAGNOSIS — I42.8 NONISCHEMIC CARDIOMYOPATHY (HCC): ICD-10-CM

## 2019-09-03 PROCEDURE — G8420 CALC BMI NORM PARAMETERS: HCPCS | Performed by: INTERNAL MEDICINE

## 2019-09-03 PROCEDURE — 1036F TOBACCO NON-USER: CPT | Performed by: INTERNAL MEDICINE

## 2019-09-03 PROCEDURE — G8427 DOCREV CUR MEDS BY ELIG CLIN: HCPCS | Performed by: INTERNAL MEDICINE

## 2019-09-03 PROCEDURE — 99214 OFFICE O/P EST MOD 30 MIN: CPT | Performed by: INTERNAL MEDICINE

## 2019-12-16 RX ORDER — FUROSEMIDE 20 MG/1
TABLET ORAL
Qty: 30 TABLET | Refills: 0 | Status: SHIPPED | OUTPATIENT
Start: 2019-12-16 | End: 2020-01-17

## 2020-01-08 RX ORDER — SPIRONOLACTONE 25 MG/1
TABLET ORAL
Qty: 30 TABLET | Refills: 1 | Status: SHIPPED | OUTPATIENT
Start: 2020-01-08 | End: 2020-03-10

## 2020-01-13 ENCOUNTER — TELEPHONE (OUTPATIENT)
Dept: CARDIOLOGY CLINIC | Age: 35
End: 2020-01-13

## 2020-01-17 RX ORDER — FUROSEMIDE 20 MG/1
TABLET ORAL
Qty: 30 TABLET | Refills: 0 | Status: SHIPPED | OUTPATIENT
Start: 2020-01-17 | End: 2020-02-26

## 2020-01-29 ENCOUNTER — OFFICE VISIT (OUTPATIENT)
Dept: CARDIOLOGY CLINIC | Age: 35
End: 2020-01-29
Payer: COMMERCIAL

## 2020-01-29 VITALS
OXYGEN SATURATION: 99 % | HEART RATE: 84 BPM | WEIGHT: 148.5 LBS | SYSTOLIC BLOOD PRESSURE: 120 MMHG | DIASTOLIC BLOOD PRESSURE: 62 MMHG | BODY MASS INDEX: 26.31 KG/M2 | HEIGHT: 63 IN

## 2020-01-29 PROCEDURE — 1036F TOBACCO NON-USER: CPT | Performed by: NURSE PRACTITIONER

## 2020-01-29 PROCEDURE — 99214 OFFICE O/P EST MOD 30 MIN: CPT | Performed by: NURSE PRACTITIONER

## 2020-01-29 PROCEDURE — G8427 DOCREV CUR MEDS BY ELIG CLIN: HCPCS | Performed by: NURSE PRACTITIONER

## 2020-01-29 PROCEDURE — G8419 CALC BMI OUT NRM PARAM NOF/U: HCPCS | Performed by: NURSE PRACTITIONER

## 2020-01-29 PROCEDURE — G8484 FLU IMMUNIZE NO ADMIN: HCPCS | Performed by: NURSE PRACTITIONER

## 2020-01-29 RX ORDER — CARVEDILOL 25 MG/1
25 TABLET ORAL 2 TIMES DAILY WITH MEALS
Qty: 180 TABLET | Refills: 3 | Status: SHIPPED | OUTPATIENT
Start: 2020-01-29 | End: 2021-02-01

## 2020-01-29 RX ORDER — DIGOXIN 125 MCG
125 TABLET ORAL DAILY
Qty: 90 TABLET | Refills: 3 | Status: SHIPPED | OUTPATIENT
Start: 2020-01-29 | End: 2020-10-05 | Stop reason: SDUPTHER

## 2020-01-29 NOTE — PROGRESS NOTES
05/07/19  Limited echo for LV function.   The left ventricular systolic function is moderately reduced with an   ejection fraction of 35-40 %.   EF by Kay's method estimated at 31%.   Moderate global hypokinesis.   Normal left ventricular diastolic filling pressure.   Mild to moderate (eccentric) mitral regurgitation.   MIld tricuspid regurgitation.   Systolic pulmonary artery pressure (SPAP) is normal and estimated at 20 mmHg   (right atrial pressure 3 mmHg).   Compared to last echo on 11/30/2018 (EF 15-20%), left ventricle systolic   function has improved. The mitral and tricuspid regurgitation have decreased   in severity. Echo 11/13/18   The left ventricular systolic function is severely reduced with an ejection   fraction of 15-20 %.   EF by Kay's method estimated at 16%.   Upper limits of normal left ventricle size.   Severe global hypokinesis. Paradoxical septal motion. No intracardiac   thrombus.   Elevated left ventricular diastolic filling pressure.   The right ventricle is normal in size with decreased function.   The left atrium is moderately dilated.   The right atrium is mildly dilated.   Color flow demonstrates eccentric jet suggesting moderate mitral   regurgitation.   Mild pulmonic regurgitation.   Moderate tricuspid regurgitation.   Systolic pulmonary artery pressure (SPAP) is normal and estimated at 29 mmHg   (right atrial pressure 8 mmHg). Cardiac cath:  12/3/18:  Normal coronary arteries   Right atrial pressure  of 4, RV pressure of 24/6, PA pressure of 24/11 with a mean of 18. Pulmonary artery wedge pressure of 11.  Cardiac output of 3.11, index of  1.94, SVR is 1903, PVR is 200.  PA saturation is 57%.  Right atrial  saturation is 61% and RV saturation 59%.  Aortic saturation 98%.     NYHA:   I  ACC/ AHA Stage:    C    Pertinent Problems:  · Non-ischemic cardiomyopathy.  LVEF 33%-->82-20%  · Systolic heart failure   · Hx of polysubstance abuse  · Hx of ADD previously on

## 2020-01-29 NOTE — LETTER
[address]   Cardiac Follow-up    Primary Care Doctor:  Chacho Mandujano MD    Chief Complaint   Patient presents with    Follow-up     2 mo no cardiac complaints        History of Present Illness:   I had the pleasure of seeing Adrián Lopes in follow up for cardiomyopathy. Admitted from 11/30/18-12/5/18 for shortness of breath. LVEF 16% on echo. Cardiac cath was negative. Started and discharged with Toprol, lisinopril, digoxin, Lasix, and spironolactone. Stopped Adderall due to NSVT seen on EKG. Since last visit, saw Dr. Keli Cooper 9/3/19. She had echo last summer showing improved LVEF 35-40%. Taking medications as prescribed. Denies any chest pain. No shortness of breath. Working about 5 hours a day. Tolerating medications. Not taking lasix anymore. No edema. Overall, she continues to do well. Adrián Lopes denies chest pain, dyspnea, palpitations, orthopnea, fatigue, edema. Past Medical History:   has a past medical history of Anxiety, Attention deficit disorder (ADD) without hyperactivity, Bipolar 2 disorder ([facility] [address] Congestive heart failure of unknown etiology ([facility] [address] and Substance abuse ([facility] [address] Surgical History:   has a past surgical history that includes Appendectomy. Social History:   reports that she has never smoked. She has never used smokeless tobacco. She reports that she does not drink alcohol or use drugs. Family History:   No family history on file. Home Medications:  Prior to Admission medications    Medication Sig Start Date End Date Taking?  Authorizing Provider   furosemide (LASIX) 20 MG tablet TAKE ONE TABLET BY MOUTH DAILY AS NEEDED FOR WEIGHT GAIN (3 LBS IN ONE DAYS OR 5 LBS IN ONE WEEK) 1/17/20   Essie Lucio MD   spironolactone (ALDACTONE) 25 MG tablet TAKE ONE TABLET BY MOUTH DAILY 1/8/20   Essie Lucio MD   sacubitril-valsartan [facility]) 49-51 MG per tablet Take 1 tablet by mouth 2 times daily 5/7/19   Essie Lucio MD carvedilol (COREG) 12.5 MG tablet Take 1 tablet by mouth 2 times daily (with meals) 5/7/19   Bia Canas MD   digoxin [facility]) 125 MCG tablet Take 1 tablet by mouth daily 4/4/19   DANIEL Graff CNP   Blood Pressure Monitor KIT 1 Device by Does not apply route See Admin Instructions 12/27/18   DANIEL Graff CNP   Norgestimate-Eth Estradiol (SPRINTEC 28 PO) Take by mouth    Historical Provider, MD      Allergies:  Seroquel [quetiapine fumarate]     Review of Systems:   · Constitutional: there has been no unanticipated weight loss. · Eyes: No vision changes  · ENT: No Headaches, no nasal congestion. No mouth sores or sore throat. · Cardiovascular: Reviewed in HPI  · Respiratory: No cough or wheezing, no sputum production. · Gastrointestinal: No abdominal pain, no constipation or diarrhea  · Genitourinary: No dysuria, trouble voiding, or hematuria. · Musculoskeletal:  No weakness or joint complaints. · Integumentary: No rash or pruritis. · Neurological: No numbness or tingling. No weakness. No tremor. · Psychiatric: No anxiety, no depression. · Endocrine:  No excessive thirst or urination. · Hematologic/Lymphatic: No abnormal bruising or bleeding, blood clots or swollen lymph nodes. Physical Examination:    Vitals:    01/29/20 1129 01/29/20 1133   BP: (!) 118/48 120/62   Pulse: 84 84   SpO2: 99% 99%   Weight: 148 lb 8 oz (67.4 kg)    Height: 5' 3\" (1.6 m)           Constitutional and General Appearance: no apparent distress, appears well.    HEENT: non-icteric sclera, oropharynx without exudate, oral mucosa moist  Neck: JVP less than 8 cm H20  Respiratory:  · No use of accessory muscles  · Clear breath sounds throughout, no wheezing, no crackles, no rhonchi  Cardiovascular:  · The apical impulses not displaced  · Heart tones are crisp and normal, no murmur/rub/gallop  · Regular rate and rhythm, S1,S2 normal  · Radial pulses 2+ and equal bilaterally  · No edema Recent Testing:    Echo: 05/07/19  Limited echo for LV function.   The left ventricular systolic function is moderately reduced with an   ejection fraction of 35-40 %.   EF by Kay's method estimated at 31%.   Moderate global hypokinesis.   Normal left ventricular diastolic filling pressure.   Mild to moderate (eccentric) mitral regurgitation.   MIld tricuspid regurgitation.   Systolic pulmonary artery pressure (SPAP) is normal and estimated at 20 mmHg   (right atrial pressure 3 mmHg).   Compared to last echo on 11/30/2018 (EF 15-20%), left ventricle systolic   function has improved. The mitral and tricuspid regurgitation have decreased   in severity. Echo 11/13/18   The left ventricular systolic function is severely reduced with an ejection   fraction of 15-20 %.   EF by Kay's method estimated at 16%.   Upper limits of normal left ventricle size.   Severe global hypokinesis. Paradoxical septal motion. No intracardiac   thrombus.   Elevated left ventricular diastolic filling pressure.   The right ventricle is normal in size with decreased function.   The left atrium is moderately dilated.   The right atrium is mildly dilated.   Color flow demonstrates eccentric jet suggesting moderate mitral   regurgitation.   Mild pulmonic regurgitation.   Moderate tricuspid regurgitation.   Systolic pulmonary artery pressure (SPAP) is normal and estimated at 29 mmHg   (right atrial pressure 8 mmHg). Cardiac cath:  12/3/18:  Normal coronary arteries   Right atrial pressure  of 4, RV pressure of 24/6, PA pressure of 24/11 with a mean of 18. Pulmonary artery wedge pressure of 11.  Cardiac output of 3.11, index of  1.94, SVR is 1903, PVR is 200.  PA saturation is 57%.  Right atrial  saturation is 61% and RV saturation 59%.  Aortic saturation 98%.     NYHA:   I  ACC/ AHA Stage:    C    Pertinent Problems:  · Non-ischemic cardiomyopathy.  LVEF 19%-->33-68%  · Systolic heart failure   · Hx of polysubstance abuse

## 2020-01-29 NOTE — PATIENT INSTRUCTIONS
1. Adjust coreg to 25mg twice a day for now  2. Check labs- CMP, BNP, CBC, lipid  3. Continue Entresto 49-51mg twice a day  4. Continue lasix as needed  5.  Spironolactone (aldactone) once a day  Repeat Echo in May   Follow up 6 months

## 2020-02-26 RX ORDER — FUROSEMIDE 20 MG/1
TABLET ORAL
Qty: 30 TABLET | Refills: 2 | Status: SHIPPED | OUTPATIENT
Start: 2020-02-26 | End: 2020-06-10

## 2020-03-10 RX ORDER — SPIRONOLACTONE 25 MG/1
TABLET ORAL
Qty: 30 TABLET | Refills: 0 | Status: SHIPPED | OUTPATIENT
Start: 2020-03-10 | End: 2020-04-07

## 2020-03-10 NOTE — TELEPHONE ENCOUNTER
LOV 01/29/2020 w/ NPRB    Plan:      1. Adjust coreg to 25mg twice a day for now  2. Check labs- CMP, BNP, CBC, lipid  3. Continue Entresto 49-51mg twice a day  4. Continue lasix as needed  5.  Spironolactone (aldactone) once a day  Repeat Echo in May   Follow up 6 months

## 2020-03-13 RX ORDER — SACUBITRIL AND VALSARTAN 24; 26 MG/1; MG/1
TABLET, FILM COATED ORAL
Qty: 180 TABLET | Refills: 1 | OUTPATIENT
Start: 2020-03-13

## 2020-03-13 RX ORDER — SACUBITRIL AND VALSARTAN 49; 51 MG/1; MG/1
1 TABLET, FILM COATED ORAL 2 TIMES DAILY
Qty: 180 TABLET | Refills: 3 | Status: SHIPPED | OUTPATIENT
Start: 2020-03-13 | End: 2021-01-19

## 2020-04-07 ENCOUNTER — TELEPHONE (OUTPATIENT)
Dept: CARDIOLOGY CLINIC | Age: 35
End: 2020-04-07

## 2020-04-07 RX ORDER — SPIRONOLACTONE 25 MG/1
TABLET ORAL
Qty: 90 TABLET | Refills: 1 | Status: SHIPPED | OUTPATIENT
Start: 2020-04-07 | End: 2020-10-05

## 2020-04-07 NOTE — TELEPHONE ENCOUNTER
Entresto samples have been in drug closet since 1/16/2020. I called and LMOM for pt letting her know samples needs picked up asap, if not picked up by Friday they will be redistributed. Pt also informed to wear mask into the office due to current health situation. Pt to contact the office with concerns.

## 2020-08-18 RX ORDER — FUROSEMIDE 20 MG/1
TABLET ORAL
Qty: 30 TABLET | Refills: 0 | Status: SHIPPED | OUTPATIENT
Start: 2020-08-18 | End: 2021-09-08 | Stop reason: SDUPTHER

## 2020-08-18 NOTE — TELEPHONE ENCOUNTER
LMOM letting pt know [facility]. Lab orders placed in epic. Pt to call with concerns.

## 2020-10-05 RX ORDER — SPIRONOLACTONE 25 MG/1
TABLET ORAL
Qty: 90 TABLET | Refills: 1 | OUTPATIENT
Start: 2020-10-05

## 2020-10-05 RX ORDER — DIGOXIN 125 MCG
125 TABLET ORAL DAILY
Qty: 30 TABLET | Refills: 0 | Status: SHIPPED | OUTPATIENT
Start: 2020-10-05 | End: 2021-03-03

## 2020-10-05 NOTE — TELEPHONE ENCOUNTER
Refill on spironolactone (ALDACTONE) 25 MG tablet   digoxin (LANOXIN) 125 MCG tablet   [facility], [place] - [address] [facility],[address] [place] [address] [phone] - f [phone]

## 2020-11-03 NOTE — PROGRESS NOTES
rhonchi  Cardiovascular:  · The apical impulses not displaced  · Heart tones are crisp and normal, no murmur/rub/gallop  · Regular rate and rhythm, S1,S2 normal  · Radial pulses 2+ and equal bilaterally  · No edema  · Pedal Pulses: 2+ and equal   Abdomen:  · No masses or tenderness  · Liver: No Abnormalities Noted  Musculoskeletal/Skin:  · Exhibits normal gait balance and coordination  · There is no clubbing, cyanosis of the extremities  · Skin is warm and dry  · Moves all extremities well  Neurological/Psychiatric:  · Alert and oriented in all spheres  · No abnormalities of mood, affect, memory, mentation, or behavior are noted      Lab Data:  CBC:   Lab Results   Component Value Date    WBC 7.5 12/05/2018    WBC 7.0 12/04/2018    WBC 7.5 12/03/2018    RBC 4.43 12/05/2018    RBC 4.71 12/04/2018    RBC 4.21 12/03/2018    HGB 14.4 12/05/2018    HGB 15.3 12/04/2018    HGB 13.6 12/03/2018    HCT 41.9 12/05/2018    HCT 45.0 12/04/2018    HCT 40.3 12/03/2018    MCV 94.5 12/05/2018    MCV 95.4 12/04/2018    MCV 95.7 12/03/2018    RDW 12.4 12/05/2018    RDW 12.3 12/04/2018    RDW 12.6 12/03/2018     12/05/2018     12/04/2018     12/03/2018     Iron:   Lab Results   Component Value Date    IRON 51 11/30/2018    TIBC 453 (H) 11/30/2018    FERRITIN 39.3 11/30/2018     BMP:   Lab Results   Component Value Date     05/07/2019     01/16/2019     12/17/2018    K 4.8 05/07/2019    K 4.8 04/04/2019    K 4.8 01/16/2019    CL 98 05/07/2019     01/16/2019     12/17/2018    CO2 29 05/07/2019    CO2 25 01/16/2019    CO2 27 12/17/2018    BUN 11 05/07/2019    BUN 12 01/16/2019    BUN 11 12/17/2018    CREATININE 0.6 05/07/2019    CREATININE 0.6 04/04/2019    CREATININE 0.6 01/16/2019     BNP:   Lab Results   Component Value Date    PROBNP 290 05/07/2019    PROBNP 921 01/16/2019    PROBNP 3,208 12/17/2018     Lipids: No results found for: CHOL   No results found for: TRIG   No results found for: HDL   No results found for: LDLCHOLESTEROL, LDLCALC   No results found for: LABVLDL, VLDL   No results found for: CHOLHDLRATIO    EF:   Lab Results   Component Value Date    LVEF 18 11/30/2018       Recent Testing:    Echo: 05/07/19  Limited echo for LV function.   The left ventricular systolic function is moderately reduced with an   ejection fraction of 35-40 %.   EF by Kay's method estimated at 31%.   Moderate global hypokinesis.   Normal left ventricular diastolic filling pressure.   Mild to moderate (eccentric) mitral regurgitation.   MIld tricuspid regurgitation.   Systolic pulmonary artery pressure (SPAP) is normal and estimated at 20 mmHg   (right atrial pressure 3 mmHg).   Compared to last echo on 11/30/2018 (EF 15-20%), left ventricle systolic   function has improved. The mitral and tricuspid regurgitation have decreased   in severity. Echo 11/13/18   The left ventricular systolic function is severely reduced with an ejection   fraction of 15-20 %.   EF by Kay's method estimated at 16%.   Upper limits of normal left ventricle size.   Severe global hypokinesis. Paradoxical septal motion. No intracardiac   thrombus.   Elevated left ventricular diastolic filling pressure.   The right ventricle is normal in size with decreased function.   The left atrium is moderately dilated.   The right atrium is mildly dilated.   Color flow demonstrates eccentric jet suggesting moderate mitral   regurgitation.   Mild pulmonic regurgitation.   Moderate tricuspid regurgitation.   Systolic pulmonary artery pressure (SPAP) is normal and estimated at 29 mmHg   (right atrial pressure 8 mmHg). Cardiac cath:  12/3/18:  Normal coronary arteries   Right atrial pressure  of 4, RV pressure of 24/6, PA pressure of 24/11 with a mean of 18.    Pulmonary artery wedge pressure of 11.  Cardiac output of 3.11, index of  1.94, SVR is 1903, PVR is 200.  PA saturation is 57%.  Right atrial  saturation is 61% and RV saturation 59%.  Aortic saturation 98%.     NYHA:   I  ACC/ AHA Stage:    C    Pertinent Problems:  · Non-ischemic cardiomyopathy. LVEF 89%-->57-56%  · Systolic heart failure   · Hx of polysubstance abuse  · Hx of ADD previously on adderall    Visit Diagnosis:    1. Chronic systolic heart failure ([facility] [address]    2. Nonischemic cardiomyopathy ([facility] [address]      Plan:     1. coreg to 25mg twice a day for now  2. Check labs- CMP, BNP, CBC, lipid, iron -discussed the importance of having lab monitoring given her cardiac medications  3. Continue Entresto 49-51mg twice a day  4. Continue lasix as needed  5. Spironolactone (aldactone) once a day  Repeat Echo in May   Follow up 6 months     QUALITY MEASURES  1. Tobacco Cessation Counseling: NA  2. Retake of BP if >140/90:   NA  3. Documentation to PCP/referring for new patient:  Sent to PCP at close of office visit  4. CAD patient on anti-platelet: NA  5. CAD patient on STATIN therapy:  NA  6. Patient with CHF and aFib on anticoagulation:  NA     I appreciate the opportunity for caring for this patient.      Garfield Kocher, CNP, 11/4/2020, 12:56 PM

## 2020-11-04 ENCOUNTER — HOSPITAL ENCOUNTER (OUTPATIENT)
Age: 35
Discharge: HOME | End: 2020-11-04
Payer: COMMERCIAL

## 2020-11-04 ENCOUNTER — OFFICE VISIT (OUTPATIENT)
Dept: CARDIOLOGY CLINIC | Age: 35
End: 2020-11-04
Payer: COMMERCIAL

## 2020-11-04 VITALS
OXYGEN SATURATION: 99 % | SYSTOLIC BLOOD PRESSURE: 100 MMHG | WEIGHT: 140 LBS | HEART RATE: 85 BPM | BODY MASS INDEX: 24.8 KG/M2 | HEIGHT: 63 IN | DIASTOLIC BLOOD PRESSURE: 60 MMHG

## 2020-11-04 LAB
A/G RATIO: 1.1 (ref 1.1–2.2)
ALBUMIN SERPL-MCNC: 3.8 G/DL (ref 3.4–5)
ALP BLD-CCNC: 71 U/L (ref 40–129)
ALT SERPL-CCNC: 13 U/L (ref 10–40)
ANION GAP SERPL CALCULATED.3IONS-SCNC: 11 MMOL/L (ref 3–16)
AST SERPL-CCNC: 26 U/L (ref 15–37)
BILIRUB SERPL-MCNC: 0.3 MG/DL (ref 0–1)
BUN BLDV-MCNC: 8 MG/DL (ref 7–20)
CALCIUM SERPL-MCNC: 9.2 MG/DL (ref 8.3–10.6)
CHLORIDE BLD-SCNC: 97 MMOL/L (ref 99–110)
CHOLESTEROL, TOTAL: 261 MG/DL (ref 0–199)
CO2: 28 MMOL/L (ref 21–32)
CREAT SERPL-MCNC: 0.5 MG/DL (ref 0.6–1.1)
FERRITIN: 148.8 NG/ML (ref 15–150)
GFR AFRICAN AMERICAN: >60
GFR NON-AFRICAN AMERICAN: >60
GLOBULIN: 3.5 G/DL
GLUCOSE BLD-MCNC: 82 MG/DL (ref 70–99)
HCT VFR BLD CALC: 37.2 % (ref 36–48)
HDLC SERPL-MCNC: 107 MG/DL (ref 40–60)
HEMOGLOBIN: 12.6 G/DL (ref 12–16)
IRON SATURATION: 37 % (ref 15–50)
IRON: 151 UG/DL (ref 37–145)
LDL CHOLESTEROL CALCULATED: 132 MG/DL
MCH RBC QN AUTO: 34 PG (ref 26–34)
MCHC RBC AUTO-ENTMCNC: 33.8 G/DL (ref 31–36)
MCV RBC AUTO: 100.7 FL (ref 80–100)
PDW BLD-RTO: 12 % (ref 12.4–15.4)
PLATELET # BLD: 287 K/UL (ref 135–450)
PMV BLD AUTO: 8 FL (ref 5–10.5)
POTASSIUM SERPL-SCNC: 4.5 MMOL/L (ref 3.5–5.1)
PRO-BNP: 122 PG/ML (ref 0–124)
RBC # BLD: 3.69 M/UL (ref 4–5.2)
SODIUM BLD-SCNC: 136 MMOL/L (ref 136–145)
TOTAL IRON BINDING CAPACITY: 409 UG/DL (ref 260–445)
TOTAL PROTEIN: 7.3 G/DL (ref 6.4–8.2)
TRIGL SERPL-MCNC: 111 MG/DL (ref 0–150)
VLDLC SERPL CALC-MCNC: 22 MG/DL
WBC # BLD: 6.2 K/UL (ref 4–11)

## 2020-11-04 PROCEDURE — 99214 OFFICE O/P EST MOD 30 MIN: CPT | Performed by: NURSE PRACTITIONER

## 2020-11-04 PROCEDURE — 85027 COMPLETE CBC AUTOMATED: CPT

## 2020-11-04 PROCEDURE — 83880 ASSAY OF NATRIURETIC PEPTIDE: CPT

## 2020-11-04 PROCEDURE — G8427 DOCREV CUR MEDS BY ELIG CLIN: HCPCS | Performed by: NURSE PRACTITIONER

## 2020-11-04 PROCEDURE — 83540 ASSAY OF IRON: CPT

## 2020-11-04 PROCEDURE — G8420 CALC BMI NORM PARAMETERS: HCPCS | Performed by: NURSE PRACTITIONER

## 2020-11-04 PROCEDURE — 83550 IRON BINDING TEST: CPT

## 2020-11-04 PROCEDURE — 82728 ASSAY OF FERRITIN: CPT

## 2020-11-04 PROCEDURE — 36415 COLL VENOUS BLD VENIPUNCTURE: CPT

## 2020-11-04 PROCEDURE — 80061 LIPID PANEL: CPT

## 2020-11-04 PROCEDURE — G8484 FLU IMMUNIZE NO ADMIN: HCPCS | Performed by: NURSE PRACTITIONER

## 2020-11-04 PROCEDURE — 1036F TOBACCO NON-USER: CPT | Performed by: NURSE PRACTITIONER

## 2020-11-04 PROCEDURE — 80053 COMPREHEN METABOLIC PANEL: CPT

## 2020-11-04 RX ORDER — SPIRONOLACTONE 25 MG/1
TABLET ORAL
Qty: 30 TABLET | Refills: 0 | Status: SHIPPED | OUTPATIENT
Start: 2020-11-04 | End: 2020-11-30

## 2020-11-04 NOTE — LETTER
[address]   Cardiac Follow-up    Primary Care Doctor:  Lisa Ferreira MD    Chief Complaint   Patient presents with    Follow-up    Cardiomyopathy        History of Present Illness:   I had the pleasure of seeing Erin Claire in follow up for cardiomyopathy. Admitted from 11/30/18-12/5/18 for shortness of breath. LVEF 16% on echo. Cardiac cath was negative. Started and discharged with Toprol, lisinopril, digoxin, Lasix, and spironolactone. Stopped Adderall due to NSVT seen on EKG. echo 5/2019 showed improved LVEF 35-40%. Since last visit, she has been doing well. Continues to take her medications as prescribed. She continues on the [place], the carvedilol was increased at her last visit to 25 mg twice daily and she is tolerating that. She denies being to take any furosemide. No leg edema no chest pain no shortness of breath. She has not had her any lab work completed since 5/2019. She has some mild fatigue. 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 She is sleeping well. Erin Claire denies chest pain, dyspnea, palpitations, orthopnea, early saiety, edema, syncope. Past Medical History:   has a past medical history of Anxiety, Attention deficit disorder (ADD) without hyperactivity, Bipolar 2 disorder ([address] Congestive heart failure of unknown etiology ([facility] [address] and Substance abuse ([facility] [address] Surgical History:   has a past surgical history that includes Appendectomy. Social History:   reports that she has never smoked. She has never used smokeless tobacco. She reports that she does not drink alcohol or use drugs. Family History:   History reviewed. No pertinent family history. Home Medications:  Prior to Admission medications    Medication Sig Start Date End Date Taking?  Authorizing Provider   spironolactone (ALDACTONE) 25 MG tablet TAKE ONE TABLET BY MOUTH DAILY 10/5/20  Yes Alva Bella, APRN - CNP digoxin (LANOXIN) 125 MCG tablet Take 1 tablet by mouth daily 10/5/20  Yes DANIEL Castillo CNP   furosemide (LASIX) 20 MG tablet TAKE ONE TABLET BY MOUTH DAILY AS NEEDED FOR WEIGHT GAIN (3 LBS IN ONE DAY OR 5 LBS IN ONE WEEK) 8/18/20  Yes DANIEL Castillo CNP   sacubitril-valsartan (ENTRESTO) 49-51 MG per tablet Take 1 tablet by mouth 2 times daily 3/13/20  Yes DANIEL Castillo CNP   carvedilol (COREG) 25 MG tablet Take 1 tablet by mouth 2 times daily (with meals) 1/29/20  Yes DANIEL Castillo CNP   Norgestimate-Eth Estradiol ([facility] 28 PO) Take by mouth   Yes Historical Provider, MD   Blood Pressure Monitor KIT 1 Device by Does not apply route See Admin Instructions 12/27/18   DANIEL Castillo CNP      Allergies:  Seroquel [quetiapine fumarate]     Review of Systems:   · Constitutional: there has been no unanticipated weight loss. · Eyes: No vision changes  · ENT: No Headaches, no nasal congestion. No mouth sores or sore throat. · Cardiovascular: Reviewed in HPI  · Respiratory: No cough or wheezing, no sputum production. · Gastrointestinal: No abdominal pain, no constipation or diarrhea  · Genitourinary: No dysuria, trouble voiding, or hematuria. · Musculoskeletal:  No weakness or joint complaints. · Integumentary: No rash or pruritis. · Neurological: No numbness or tingling. No weakness. No tremor. · Psychiatric: No anxiety, no depression. · Endocrine:  No excessive thirst or urination. · Hematologic/Lymphatic: No abnormal bruising or bleeding, blood clots or swollen lymph nodes. Physical Examination:    Vitals:    11/04/20 1142 11/04/20 1146   BP: (!) 100/58 100/60   Pulse: 85    SpO2: 99%    Weight: 140 lb (63.5 kg)    Height: 5' 3\" (1.6 m)           Constitutional and General Appearance: no apparent distress, appears well.    HEENT: non-icteric sclera, mask in place  Neck: JVP less than 8 cm H20  Respiratory:  · No use of accessory muscles · Clear breath sounds throughout, no wheezing, no crackles, no rhonchi  Cardiovascular:  · The apical impulses not displaced  · Heart tones are crisp and normal, no murmur/rub/gallop  · Regular rate and rhythm, S1,S2 normal  · Radial pulses 2+ and equal bilaterally  · No edema  · Pedal Pulses: 2+ and equal   Abdomen:  · No masses or tenderness  · Liver: No Abnormalities Noted  Musculoskeletal/Skin:  · Exhibits normal gait balance and coordination  · There is no clubbing, cyanosis of the extremities  · Skin is warm and dry  · Moves all extremities well  Neurological/Psychiatric:  · Alert and oriented in all spheres  · No abnormalities of mood, affect, memory, mentation, or behavior are noted      Lab Data:  CBC:   Lab Results   Component Value Date    WBC 7.5 12/05/2018    WBC 7.0 12/04/2018    WBC 7.5 12/03/2018    RBC 4.43 12/05/2018    RBC 4.71 12/04/2018    RBC 4.21 12/03/2018    HGB 14.4 12/05/2018    HGB 15.3 12/04/2018    HGB 13.6 12/03/2018    HCT 41.9 12/05/2018    HCT 45.0 12/04/2018    HCT 40.3 12/03/2018    MCV 94.5 12/05/2018    MCV 95.4 12/04/2018    MCV 95.7 12/03/2018    RDW 12.4 12/05/2018    RDW 12.3 12/04/2018    RDW 12.6 12/03/2018     12/05/2018     12/04/2018     12/03/2018     Iron:   Lab Results   Component Value Date    IRON 51 11/30/2018    TIBC 453 (H) 11/30/2018    FERRITIN 39.3 11/30/2018     BMP:   Lab Results   Component Value Date     05/07/2019     01/16/2019     12/17/2018    K 4.8 05/07/2019    K 4.8 04/04/2019    K 4.8 01/16/2019    CL 98 05/07/2019     01/16/2019     12/17/2018    CO2 29 05/07/2019    CO2 25 01/16/2019    CO2 27 12/17/2018    BUN 11 05/07/2019    BUN 12 01/16/2019    BUN 11 12/17/2018    CREATININE 0.6 05/07/2019    CREATININE 0.6 04/04/2019    CREATININE 0.6 01/16/2019     BNP:   Lab Results   Component Value Date    PROBNP 290 05/07/2019    PROBNP 921 01/16/2019    PROBNP 3,208 12/17/2018 Lipids: No results found for: CHOL   No results found for: TRIG   No results found for: HDL   No results found for: LDLCHOLESTEROL, LDLCALC   No results found for: LABVLDL, VLDL   No results found for: CHOLHDLRATIO    EF:   Lab Results   Component Value Date    LVEF 18 11/30/2018       Recent Testing:    Echo: 05/07/19  Limited echo for LV function.   The left ventricular systolic function is moderately reduced with an   ejection fraction of 35-40 %.   EF by Kay's method estimated at 31%.   Moderate global hypokinesis.   Normal left ventricular diastolic filling pressure.   Mild to moderate (eccentric) mitral regurgitation.   MIld tricuspid regurgitation.   Systolic pulmonary artery pressure (SPAP) is normal and estimated at 20 mmHg   (right atrial pressure 3 mmHg).   Compared to last echo on 11/30/2018 (EF 15-20%), left ventricle systolic   function has improved. The mitral and tricuspid regurgitation have decreased   in severity. Echo 11/13/18   The left ventricular systolic function is severely reduced with an ejection   fraction of 15-20 %.   EF by Kay's method estimated at 16%.   Upper limits of normal left ventricle size.   Severe global hypokinesis. Paradoxical septal motion. No intracardiac   thrombus.   Elevated left ventricular diastolic filling pressure.   The right ventricle is normal in size with decreased function.   The left atrium is moderately dilated.   The right atrium is mildly dilated.   Color flow demonstrates eccentric jet suggesting moderate mitral   regurgitation.   Mild pulmonic regurgitation.   Moderate tricuspid regurgitation.   Systolic pulmonary artery pressure (SPAP) is normal and estimated at 29 mmHg   (right atrial pressure 8 mmHg). Cardiac cath:  12/3/18:  Normal coronary arteries   Right atrial pressure  of 4, RV pressure of 24/6, PA pressure of 24/11 with a mean of 18.    Pulmonary artery wedge pressure of 11.  Cardiac output of 3.11, index of 1.94, SVR is 1903, PVR is 200.  PA saturation is 57%.  Right atrial  saturation is 61% and RV saturation 59%.  Aortic saturation 98%.     NYHA:   I  ACC/ AHA Stage:    C    Pertinent Problems:  · Non-ischemic cardiomyopathy. LVEF 75%-->93-34%  · Systolic heart failure   · Hx of polysubstance abuse  · Hx of ADD previously on adderall    Visit Diagnosis:    1. Chronic systolic heart failure ([facility] [address]    2. Nonischemic cardiomyopathy ([facility] [address]      Plan:     1. coreg to 25mg twice a day for now  2. Check labs- CMP, BNP, CBC, lipid, iron -discussed the importance of having lab monitoring given her cardiac medications  3. Continue Entresto 49-51mg twice a day  4. Continue lasix as needed  5. Spironolactone (aldactone) once a day  Repeat Echo in May   Follow up 6 months     QUALITY MEASURES  1. Tobacco Cessation Counseling: NA  2. Retake of BP if >140/90:   NA  3. Documentation to PCP/referring for new patient:  Sent to PCP at close of office visit  4. CAD patient on anti-platelet: NA  5. CAD patient on STATIN therapy:  NA  6. Patient with CHF and aFib on anticoagulation:  NA     I appreciate the opportunity for caring for this patient.      Tung Purcell CNP, 11/4/2020, 12:56 PM

## 2020-11-04 NOTE — PATIENT INSTRUCTIONS
1. coreg to 25mg twice a day for now  2. Check labs- CMP, BNP, CBC, lipid, iron   3. Continue Entresto 49-51mg twice a day  4. Continue lasix as needed  5.  Spironolactone (aldactone) once a day  Repeat Echo in May   Follow up 6 months

## 2020-11-06 ENCOUNTER — TELEPHONE (OUTPATIENT)
Dept: CARDIOLOGY CLINIC | Age: 35
End: 2020-11-06

## 2020-11-06 NOTE — RESULT ENCOUNTER NOTE
Labs all look good, except for the cholesterol. Agree with low saturated fat diet and exercise. If next lipid panel elevated, then will consider adding medication therapy.

## 2020-11-06 NOTE — TELEPHONE ENCOUNTER
----- Message from DANIEL Apodaca CNP sent at 11/5/2020  8:16 AM EST -----  Covering for Kathy  Fluid number improved. Cholesterol numbers high, Kathy will review tomorrow, continue low fat diet and exercise for now.

## 2020-11-06 NOTE — TELEPHONE ENCOUNTER
Harvey Coppola, DANIEL - CNP   11/6/2020  8:25 AM       Labs all look good, except for the cholesterol. Agree with low saturated fat diet and exercise. If next lipid panel elevated, then will consider adding medication therapy. DANIEL Casillas - CNP   11/5/2020  8:16 AM       Covering for Kathy   Fluid number improved. Cholesterol numbers high, Kathy will review tomorrow, continue low fat diet and exercise for now. LMOM for pt to contact the office for results.

## 2020-11-23 ENCOUNTER — TELEPHONE (OUTPATIENT)
Dept: CARDIOLOGY CLINIC | Age: 35
End: 2020-11-23

## 2020-11-23 NOTE — TELEPHONE ENCOUNTER
Pt's mother states that pt has to go to court today and she may be incarcerated. She needs a letter explaining that she has CHF and the meds she needs to take so that she can get those while she is in custodial (if she has to go.) Her mother would like this sent to her email: [email or web address]. Please call her with any further questions.

## 2020-11-30 RX ORDER — SPIRONOLACTONE 25 MG/1
TABLET ORAL
Qty: 30 TABLET | Refills: 3 | Status: SHIPPED | OUTPATIENT
Start: 2020-11-30 | End: 2021-04-06 | Stop reason: SDUPTHER

## 2020-11-30 NOTE — TELEPHONE ENCOUNTER
Pt/pharmacy requesting spironolactone 25 mg tab. Pending script sent to [facility].     Last OV 11/4/2020  Last Labs 11/4/2020  Next OV 5/6/2021

## 2021-01-15 DIAGNOSIS — I50.22 CHRONIC SYSTOLIC HEART FAILURE (HCC): ICD-10-CM

## 2021-01-19 RX ORDER — SACUBITRIL AND VALSARTAN 49; 51 MG/1; MG/1
TABLET, FILM COATED ORAL
Qty: 180 TABLET | Refills: 3 | Status: SHIPPED | OUTPATIENT
Start: 2021-01-19 | End: 2021-09-08 | Stop reason: SDUPTHER

## 2021-02-01 DIAGNOSIS — I50.22 CHRONIC SYSTOLIC HEART FAILURE (HCC): ICD-10-CM

## 2021-02-01 NOTE — TELEPHONE ENCOUNTER
Pt/pharmacy requesting carvedilol 25 mg tab. Pending script sent to [facility].     Last OV 11/4/2020  Per last OV note:Pt to continue coreg 25 twice a day for now  Last Labs 11/4/2020  Next OV 5/6/2021

## 2021-02-02 RX ORDER — CARVEDILOL 25 MG/1
TABLET ORAL
Qty: 60 TABLET | Refills: 2 | Status: SHIPPED | OUTPATIENT
Start: 2021-02-02 | End: 2021-05-03

## 2021-03-03 RX ORDER — DIGOXIN 125 MCG
TABLET ORAL
Qty: 90 TABLET | Refills: 1 | Status: SHIPPED | OUTPATIENT
Start: 2021-03-03 | End: 2021-09-08 | Stop reason: SDUPTHER

## 2021-04-05 NOTE — TELEPHONE ENCOUNTER
Pt has lost 4 of her medications. She cannot find them anywhere. She is asking if we can please send refills to [facility] [address]   [address] [facility], [place] [address]   Phone:  [phone]  Fax:  [phone].     Please send: digoxin, carvedilol, entresto, and sprionolactone

## 2021-04-06 RX ORDER — SPIRONOLACTONE 25 MG/1
TABLET ORAL
Qty: 30 TABLET | Refills: 0 | Status: SHIPPED | OUTPATIENT
Start: 2021-04-06 | End: 2021-05-13

## 2021-04-06 NOTE — TELEPHONE ENCOUNTER
Spoke to patient, she has a refill on everything except for the spironolactone. She will check with the pharmacy to see if they can fill those early.      LOV 11/04/2020 w/ ANABELLE

## 2021-05-02 DIAGNOSIS — I50.22 CHRONIC SYSTOLIC HEART FAILURE (HCC): ICD-10-CM

## 2021-05-03 NOTE — TELEPHONE ENCOUNTER
Pt/pharmacy requesting carvedilol 25 mg tab. Pending script sent to [facility].     Last OV 11/4/2020  Next OV 5/6/2021

## 2021-05-04 RX ORDER — CARVEDILOL 25 MG/1
TABLET ORAL
Qty: 60 TABLET | Refills: 1 | Status: SHIPPED | OUTPATIENT
Start: 2021-05-04 | End: 2021-07-01

## 2021-05-13 RX ORDER — SPIRONOLACTONE 25 MG/1
TABLET ORAL
Qty: 30 TABLET | Refills: 0 | Status: SHIPPED | OUTPATIENT
Start: 2021-05-13 | End: 2021-06-08

## 2021-06-08 RX ORDER — SPIRONOLACTONE 25 MG/1
TABLET ORAL
Qty: 30 TABLET | Refills: 3 | Status: SHIPPED | OUTPATIENT
Start: 2021-06-08 | End: 2021-09-08 | Stop reason: SDUPTHER

## 2021-07-01 ENCOUNTER — TELEPHONE (OUTPATIENT)
Dept: CARDIOLOGY CLINIC | Age: 36
End: 2021-07-01

## 2021-08-04 DIAGNOSIS — I50.22 CHRONIC SYSTOLIC HEART FAILURE (HCC): ICD-10-CM

## 2021-08-09 RX ORDER — CARVEDILOL 25 MG/1
TABLET ORAL
Qty: 60 TABLET | Refills: 0 | Status: SHIPPED | OUTPATIENT
Start: 2021-08-09 | End: 2021-09-07

## 2021-09-07 DIAGNOSIS — I50.22 CHRONIC SYSTOLIC HEART FAILURE (HCC): ICD-10-CM

## 2021-09-07 RX ORDER — CARVEDILOL 25 MG/1
TABLET ORAL
Qty: 60 TABLET | Refills: 3 | Status: SHIPPED | OUTPATIENT
Start: 2021-09-07

## 2021-09-07 NOTE — TELEPHONE ENCOUNTER
Pt called very apologetic about missing previous appts. She was able to make an appt now she state for 10-25-21 at 1pm with [facility]. She stated her cardiac medication scripts are about to . She is needing the following refilled to last until next appt to [facility]: spironolactone, digoxin, entresto, furosemide. I informed the pt is there is any discrepancy with this message that our office will call her back with response.

## 2021-09-08 DIAGNOSIS — I50.21 ACUTE SYSTOLIC (CONGESTIVE) HEART FAILURE (HCC): ICD-10-CM

## 2021-09-08 DIAGNOSIS — I50.22 CHRONIC SYSTOLIC HEART FAILURE (HCC): Primary | ICD-10-CM

## 2021-09-08 DIAGNOSIS — I50.9 CONGESTIVE HEART FAILURE OF UNKNOWN ETIOLOGY (HCC): ICD-10-CM

## 2021-09-08 RX ORDER — FUROSEMIDE 20 MG/1
TABLET ORAL
Qty: 30 TABLET | Refills: 1 | Status: SHIPPED | OUTPATIENT
Start: 2021-09-08 | End: 2021-11-29 | Stop reason: SDUPTHER

## 2021-09-08 RX ORDER — SPIRONOLACTONE 25 MG/1
25 TABLET ORAL DAILY
Qty: 30 TABLET | Refills: 1 | Status: SHIPPED | OUTPATIENT
Start: 2021-09-08 | End: 2021-12-20 | Stop reason: SDUPTHER

## 2021-09-08 RX ORDER — SACUBITRIL AND VALSARTAN 49; 51 MG/1; MG/1
TABLET, FILM COATED ORAL
Qty: 60 TABLET | Refills: 1 | Status: SHIPPED | OUTPATIENT
Start: 2021-09-08

## 2021-09-08 RX ORDER — DIGOXIN 125 MCG
TABLET ORAL
Qty: 30 TABLET | Refills: 1 | Status: SHIPPED | OUTPATIENT
Start: 2021-09-08 | End: 2021-11-29 | Stop reason: SDUPTHER

## 2021-10-25 ENCOUNTER — OFFICE VISIT (OUTPATIENT)
Dept: CARDIOLOGY CLINIC | Age: 36
End: 2021-10-25
Payer: COMMERCIAL

## 2021-10-25 ENCOUNTER — HOSPITAL ENCOUNTER (OUTPATIENT)
Age: 36
Discharge: HOME | End: 2021-10-25
Payer: COMMERCIAL

## 2021-10-25 VITALS
HEIGHT: 63 IN | HEART RATE: 101 BPM | BODY MASS INDEX: 23.46 KG/M2 | SYSTOLIC BLOOD PRESSURE: 114 MMHG | DIASTOLIC BLOOD PRESSURE: 68 MMHG | OXYGEN SATURATION: 98 % | WEIGHT: 132.4 LBS

## 2021-10-25 DIAGNOSIS — Z51.81 ENCOUNTER FOR MONITORING DIGOXIN THERAPY: ICD-10-CM

## 2021-10-25 DIAGNOSIS — I42.8 NONISCHEMIC CARDIOMYOPATHY (HCC): ICD-10-CM

## 2021-10-25 DIAGNOSIS — R00.0 TACHYCARDIA: ICD-10-CM

## 2021-10-25 DIAGNOSIS — I50.22 CHRONIC SYSTOLIC HEART FAILURE (HCC): Primary | ICD-10-CM

## 2021-10-25 DIAGNOSIS — Z79.899 ENCOUNTER FOR MONITORING DIGOXIN THERAPY: ICD-10-CM

## 2021-10-25 DIAGNOSIS — I50.22 CHRONIC SYSTOLIC HEART FAILURE (HCC): ICD-10-CM

## 2021-10-25 DIAGNOSIS — I50.21 ACUTE SYSTOLIC (CONGESTIVE) HEART FAILURE (HCC): ICD-10-CM

## 2021-10-25 LAB
ANION GAP SERPL CALCULATED.3IONS-SCNC: 13 MMOL/L (ref 3–16)
BUN BLDV-MCNC: 9 MG/DL (ref 7–20)
CALCIUM SERPL-MCNC: 9.2 MG/DL (ref 8.3–10.6)
CHLORIDE BLD-SCNC: 99 MMOL/L (ref 99–110)
CHOLESTEROL, TOTAL: 236 MG/DL (ref 0–199)
CO2: 27 MMOL/L (ref 21–32)
CREAT SERPL-MCNC: <0.5 MG/DL (ref 0.6–1.1)
DIGOXIN LEVEL: <0.3 NG/ML (ref 0.8–2)
GFR AFRICAN AMERICAN: >60
GFR NON-AFRICAN AMERICAN: >60
GLUCOSE BLD-MCNC: 85 MG/DL (ref 70–99)
HCT VFR BLD CALC: 40.9 % (ref 36–48)
HDLC SERPL-MCNC: 118 MG/DL (ref 40–60)
HEMOGLOBIN: 13.8 G/DL (ref 12–16)
LDL CHOLESTEROL CALCULATED: 104 MG/DL
MCH RBC QN AUTO: 34.1 PG (ref 26–34)
MCHC RBC AUTO-ENTMCNC: 33.6 G/DL (ref 31–36)
MCV RBC AUTO: 101.3 FL (ref 80–100)
PDW BLD-RTO: 12.4 % (ref 12.4–15.4)
PLATELET # BLD: 306 K/UL (ref 135–450)
PMV BLD AUTO: 7.4 FL (ref 5–10.5)
POTASSIUM SERPL-SCNC: 5.1 MMOL/L (ref 3.5–5.1)
PRO-BNP: 57 PG/ML (ref 0–124)
RBC # BLD: 4.04 M/UL (ref 4–5.2)
SODIUM BLD-SCNC: 139 MMOL/L (ref 136–145)
TRIGL SERPL-MCNC: 68 MG/DL (ref 0–150)
TSH REFLEX FT4: 1.92 UIU/ML (ref 0.27–4.2)
VLDLC SERPL CALC-MCNC: 14 MG/DL
WBC # BLD: 5.4 K/UL (ref 4–11)

## 2021-10-25 PROCEDURE — G8420 CALC BMI NORM PARAMETERS: HCPCS | Performed by: NURSE PRACTITIONER

## 2021-10-25 PROCEDURE — 84443 ASSAY THYROID STIM HORMONE: CPT

## 2021-10-25 PROCEDURE — 1036F TOBACCO NON-USER: CPT | Performed by: NURSE PRACTITIONER

## 2021-10-25 PROCEDURE — 80048 BASIC METABOLIC PNL TOTAL CA: CPT

## 2021-10-25 PROCEDURE — 85027 COMPLETE CBC AUTOMATED: CPT

## 2021-10-25 PROCEDURE — 80061 LIPID PANEL: CPT

## 2021-10-25 PROCEDURE — 83880 ASSAY OF NATRIURETIC PEPTIDE: CPT

## 2021-10-25 PROCEDURE — 36415 COLL VENOUS BLD VENIPUNCTURE: CPT

## 2021-10-25 PROCEDURE — G8484 FLU IMMUNIZE NO ADMIN: HCPCS | Performed by: NURSE PRACTITIONER

## 2021-10-25 PROCEDURE — 80162 ASSAY OF DIGOXIN TOTAL: CPT

## 2021-10-25 PROCEDURE — 99214 OFFICE O/P EST MOD 30 MIN: CPT | Performed by: NURSE PRACTITIONER

## 2021-10-25 PROCEDURE — G8427 DOCREV CUR MEDS BY ELIG CLIN: HCPCS | Performed by: NURSE PRACTITIONER

## 2021-10-25 RX ORDER — ACETAMINOPHEN AND CODEINE PHOSPHATE 120; 12 MG/5ML; MG/5ML
1 SOLUTION ORAL DAILY
COMMUNITY

## 2021-10-25 NOTE — PROGRESS NOTES
[facility]   Cardiac Follow-up    Primary Care Doctor:  Yen Hernandez MD    Chief Complaint   Patient presents with    Follow-up    Congestive Heart Failure        History of Present Illness:   I had the pleasure of seeing Skyla Mitchell in follow up for cardiomyopathy. Admitted from 11/30/18-12/5/18 for shortness of breath. LVEF 16% on echo. Cardiac cath was negative. Started and discharged with Toprol, lisinopril, digoxin, Lasix, and spironolactone. Stopped Adderall due to NSVT seen on EKG. echo 5/2019 showed improved LVEF 35-40%. Since last visit, she is doing well. No complaints. Taking medications as prescribed. No chest pain. No shortness of breath. Does have some fatigue after working 4-5 hours and requires resting break. Otherwise, feels well. She tried to get labs completed today but unable to get blood drawn due to being a difficult lab stick. She plans to go back to the lab to try again today. Transportation has been an issue. No lightheadedness or dizziness. Sleeping well. Not needing any lasix for over 1 year. No edema. No substance use, very occasional social alcohol. Skyla Mitchell describes symptoms including fatigue but denies chest pain, dyspnea, palpitations, orthopnea, syncope. Past Medical History:   has a past medical history of Anxiety, Attention deficit disorder (ADD) without hyperactivity, Bipolar 2 disorder ([address] Congestive heart failure of unknown etiology ([facility] [address] and Substance abuse ([facility] [address] Surgical History:   has a past surgical history that includes Appendectomy. Social History:   reports that she has never smoked. She has never used smokeless tobacco. She reports that she does not drink alcohol and does not use drugs. Family History:   History reviewed. No pertinent family history. Home Medications:  Prior to Admission medications    Medication Sig Start Date End Date Taking?  Authorizing Provider   spironolactone (ALDACTONE) 25 MG tablet Take 1 tablet by mouth daily 9/8/21   DANIEL Shah CNP   sacubitril-valsartan (ENTRESTO) 49-51 MG per tablet TAKE ONE TABLET BY MOUTH TWICE A DAY 9/8/21   DANIEL Shah CNP   furosemide (LASIX) 20 MG tablet TAKE ONE TABLET BY MOUTH DAILY AS NEEDED FOR WEIGHT GAIN (3 LBS IN ONE DAY OR 5 LBS IN ONE WEEK) 9/8/21   DANIEL Shah CNP   digoxin (LANOXIN) 125 MCG tablet TAKE ONE TABLET BY MOUTH DAILY 9/8/21   DANIEL Shah CNP   carvedilol (COREG) 25 MG tablet TAKE ONE TABLET BY MOUTH TWICE A DAY WITH MEALS 9/7/21   DANIEL Granados CNP   Blood Pressure Monitor KIT 1 Device by Does not apply route See Admin Instructions 12/27/18   DANIEL Shah CNP      Allergies:  Seroquel [quetiapine fumarate]       Physical Examination:    Vitals:    10/25/21 1314   BP: 114/68   Pulse: 101   SpO2: 98%   Weight: 132 lb 6.4 oz (60.1 kg)   Height: 5' 3\" (1.6 m)        Constitutional and General Appearance: no apparent distress, appears well  HEENT: non-icteric sclera, mask in place  Neck: JVP less than 8 cm H20  Respiratory:  · No use of accessory muscles  · Clear breath sounds throughout, no wheezing, no crackles, no rhonchi  Cardiovascular:  · The apical impulses not displaced  · Heart tones are crisp and normal, no murmur/rub/gallop  · Tachy rate and rhythm, S1,S2 normal  · Radial pulses 2+ and equal bilaterally  · No edema  · Pedal Pulses: 2+ and equal   Abdomen:  · No masses or tenderness  · Liver: No Abnormalities Noted  Musculoskeletal/Skin:  · Exhibits normal gait balance and coordination  · There is no clubbing, cyanosis of the extremities  · Skin is warm and dry  · Moves all extremities well  Neurological/Psychiatric:  · Alert and oriented in all spheres  · No abnormalities of mood, affect, memory, mentation, or behavior are noted      Lab Data:  CBC:   Lab Results   Component Value Date    WBC 6.2 11/04/2020    WBC 7.5 12/05/2018    WBC 7.0 12/04/2018    RBC 3.69 11/04/2020    RBC 4.43 12/05/2018    RBC 4.71 12/04/2018    HGB 12.6 11/04/2020    HGB 14.4 12/05/2018    HGB 15.3 12/04/2018    HCT 37.2 11/04/2020    HCT 41.9 12/05/2018    HCT 45.0 12/04/2018    .7 11/04/2020    MCV 94.5 12/05/2018    MCV 95.4 12/04/2018    RDW 12.0 11/04/2020    RDW 12.4 12/05/2018    RDW 12.3 12/04/2018     11/04/2020     12/05/2018     12/04/2018     Iron:   Lab Results   Component Value Date    IRON 151 (H) 11/04/2020    TIBC 409 11/04/2020    FERRITIN 148.8 11/04/2020     BMP:   Lab Results   Component Value Date     11/04/2020     05/07/2019     01/16/2019    K 4.5 11/04/2020    K 4.8 05/07/2019    K 4.8 04/04/2019    CL 97 11/04/2020    CL 98 05/07/2019     01/16/2019    CO2 28 11/04/2020    CO2 29 05/07/2019    CO2 25 01/16/2019    BUN 8 11/04/2020    BUN 11 05/07/2019    BUN 12 01/16/2019    CREATININE 0.5 11/04/2020    CREATININE 0.6 05/07/2019    CREATININE 0.6 04/04/2019     BNP:   Lab Results   Component Value Date    PROBNP 122 11/04/2020    PROBNP 290 05/07/2019    PROBNP 921 01/16/2019     Lipids:   Lab Results   Component Value Date    CHOL 261 (H) 11/04/2020        Lab Results   Component Value Date    TRIG 111 11/04/2020        Lab Results   Component Value Date     (H) 11/04/2020        Lab Results   Component Value Date    LDLCALC 132 (H) 11/04/2020        Lab Results   Component Value Date    LABVLDL 22 11/04/2020      No results found for: CHOLHDLRATIO    EF:   Lab Results   Component Value Date    LVEF 18 11/30/2018       Recent Testing:    Echo: 05/07/19  Limited echo for LV function.   The left ventricular systolic function is moderately reduced with an   ejection fraction of 35-40 %.   EF by Kay's method estimated at 31%.   Moderate global hypokinesis.   Normal left ventricular diastolic filling pressure.   Mild to moderate (eccentric) mitral regurgitation.   MIld tricuspid regurgitation.   Systolic pulmonary artery pressure (SPAP) is normal and estimated at 20 mmHg   (right atrial pressure 3 mmHg).   Compared to last echo on 11/30/2018 (EF 15-20%), left ventricle systolic   function has improved. The mitral and tricuspid regurgitation have decreased   in severity. Echo 11/13/18   The left ventricular systolic function is severely reduced with an ejection   fraction of 15-20 %.   EF by Kay's method estimated at 16%.   Upper limits of normal left ventricle size.   Severe global hypokinesis. Paradoxical septal motion. No intracardiac   thrombus.   Elevated left ventricular diastolic filling pressure.   The right ventricle is normal in size with decreased function.   The left atrium is moderately dilated.   The right atrium is mildly dilated.   Color flow demonstrates eccentric jet suggesting moderate mitral   regurgitation.   Mild pulmonic regurgitation.   Moderate tricuspid regurgitation.   Systolic pulmonary artery pressure (SPAP) is normal and estimated at 29 mmHg   (right atrial pressure 8 mmHg). Cardiac cath:  12/3/18:  Normal coronary arteries   Right atrial pressure  of 4, RV pressure of 24/6, PA pressure of 24/11 with a mean of 18. Pulmonary artery wedge pressure of 11.  Cardiac output of 3.11, index of  1.94, SVR is 1903, PVR is 200.  PA saturation is 57%.  Right atrial  saturation is 61% and RV saturation 59%.  Aortic saturation 98%.     NYHA:   I  ACC/ AHA Stage:    C    Pertinent Problems:  · Non-ischemic cardiomyopathy. LVEF 22%-->14-61%  · Systolic heart failure   · Hx of polysubstance abuse  · Hx of ADD previously on adderall    Visit Diagnosis:    1. Chronic systolic heart failure ([address]    2. Nonischemic cardiomyopathy ([address]    3. Tachycardia    4. Encounter for monitoring digoxin therapy      Plan:   1. coreg to 25mg twice a day for now  2 check labs, add on lipids, TSH, digoxin - will call you lab results  3. Continue Entresto 49-51mg twice a day  4. Continue only lasix as needed  5. Spironolactone (aldactone) once a day  Repeat echo - call to schedule echo [phone]   Follow up 1 year     I appreciate the opportunity for caring for this patient.      DANIEL Garner - CNP, CNP, 10/25/2021, 1:43 PM

## 2021-10-25 NOTE — PATIENT INSTRUCTIONS
1. coreg to 25mg twice a day for now  2 check labs, add on lipids, TSH, digoxin - will call you lab results  3. Continue Entresto 49-51mg twice a day  4. Continue only lasix as needed  5.  Spironolactone (aldactone) once a day  Repeat echo - call to schedule echo [phone]   Follow up 1 year

## 2021-10-25 NOTE — LETTER
[facility]   Cardiac Follow-up    Primary Care Doctor:  Yen Hernandez MD    Chief Complaint   Patient presents with    Follow-up    Congestive Heart Failure        History of Present Illness:   I had the pleasure of seeing Skyla Mitchell in follow up for cardiomyopathy. Admitted from 11/30/18-12/5/18 for shortness of breath. LVEF 16% on echo. Cardiac cath was negative. Started and discharged with Toprol, lisinopril, digoxin, Lasix, and spironolactone. Stopped Adderall due to NSVT seen on EKG. echo 5/2019 showed improved LVEF 35-40%. Since last visit, she is doing well. No complaints. Taking medications as prescribed. No chest pain. No shortness of breath. Does have some fatigue after working 4-5 hours and requires resting break. Otherwise, feels well. She tried to get labs completed today but unable to get blood drawn due to being a difficult lab stick. She plans to go back to the lab to try again today. Transportation has been an issue. No lightheadedness or dizziness. Sleeping well. Not needing any lasix for over 1 year. No edema. No substance use, very occasional social alcohol. Skyla Mitchell describes symptoms including fatigue but denies chest pain, dyspnea, palpitations, orthopnea, syncope. Past Medical History:   has a past medical history of Anxiety, Attention deficit disorder (ADD) without hyperactivity, Bipolar 2 disorder ([address] Congestive heart failure of unknown etiology ([facility] [address] and Substance abuse ([facility] [address] Surgical History:   has a past surgical history that includes Appendectomy. Social History:   reports that she has never smoked. She has never used smokeless tobacco. She reports that she does not drink alcohol and does not use drugs. Family History:   History reviewed. No pertinent family history. Home Medications:  Prior to Admission medications    Medication Sig Start Date End Date Taking?  Authorizing Provider   spironolactone (ALDACTONE) 25 MG tablet Take 1 tablet by mouth daily 9/8/21   DANIEL Chou CNP   sacubitril-valsartan (ENTRESTO) 49-51 MG per tablet TAKE ONE TABLET BY MOUTH TWICE A DAY 9/8/21   DANIEL Chou CNP   furosemide (LASIX) 20 MG tablet TAKE ONE TABLET BY MOUTH DAILY AS NEEDED FOR WEIGHT GAIN (3 LBS IN ONE DAY OR 5 LBS IN ONE WEEK) 9/8/21   DANIEL Chou CNP   digoxin (LANOXIN) 125 MCG tablet TAKE ONE TABLET BY MOUTH DAILY 9/8/21   DANIEL Chou CNP   carvedilol (COREG) 25 MG tablet TAKE ONE TABLET BY MOUTH TWICE A DAY WITH MEALS 9/7/21   DANIEL Moy CNP   Blood Pressure Monitor KIT 1 Device by Does not apply route See Admin Instructions 12/27/18   DANIEL Chou CNP      Allergies:  Seroquel [quetiapine fumarate]       Physical Examination:    Vitals:    10/25/21 1314   BP: 114/68   Pulse: 101   SpO2: 98%   Weight: 132 lb 6.4 oz (60.1 kg)   Height: 5' 3\" (1.6 m)        Constitutional and General Appearance: no apparent distress, appears well  HEENT: non-icteric sclera, mask in place  Neck: JVP less than 8 cm H20  Respiratory:  · No use of accessory muscles  · Clear breath sounds throughout, no wheezing, no crackles, no rhonchi  Cardiovascular:  · The apical impulses not displaced  · Heart tones are crisp and normal, no murmur/rub/gallop  · Tachy rate and rhythm, S1,S2 normal  · Radial pulses 2+ and equal bilaterally  · No edema  · Pedal Pulses: 2+ and equal   Abdomen:  · No masses or tenderness  · Liver: No Abnormalities Noted  Musculoskeletal/Skin:  · Exhibits normal gait balance and coordination  · There is no clubbing, cyanosis of the extremities  · Skin is warm and dry  · Moves all extremities well  Neurological/Psychiatric:  · Alert and oriented in all spheres  · No abnormalities of mood, affect, memory, mentation, or behavior are noted      Lab Data:  CBC:   Lab Results   Component Value Date    WBC 6.2 11/04/2020    WBC 7.5 12/05/2018    WBC 7.0 12/04/2018    RBC 3.69 11/04/2020    RBC 4.43 12/05/2018    RBC 4.71 12/04/2018    HGB 12.6 11/04/2020    HGB 14.4 12/05/2018    HGB 15.3 12/04/2018    HCT 37.2 11/04/2020    HCT 41.9 12/05/2018    HCT 45.0 12/04/2018    .7 11/04/2020    MCV 94.5 12/05/2018    MCV 95.4 12/04/2018    RDW 12.0 11/04/2020    RDW 12.4 12/05/2018    RDW 12.3 12/04/2018     11/04/2020     12/05/2018     12/04/2018     Iron:   Lab Results   Component Value Date    IRON 151 (H) 11/04/2020    TIBC 409 11/04/2020    FERRITIN 148.8 11/04/2020     BMP:   Lab Results   Component Value Date     11/04/2020     05/07/2019     01/16/2019    K 4.5 11/04/2020    K 4.8 05/07/2019    K 4.8 04/04/2019    CL 97 11/04/2020    CL 98 05/07/2019     01/16/2019    CO2 28 11/04/2020    CO2 29 05/07/2019    CO2 25 01/16/2019    BUN 8 11/04/2020    BUN 11 05/07/2019    BUN 12 01/16/2019    CREATININE 0.5 11/04/2020    CREATININE 0.6 05/07/2019    CREATININE 0.6 04/04/2019     BNP:   Lab Results   Component Value Date    PROBNP 122 11/04/2020    PROBNP 290 05/07/2019    PROBNP 921 01/16/2019     Lipids:   Lab Results   Component Value Date    CHOL 261 (H) 11/04/2020        Lab Results   Component Value Date    TRIG 111 11/04/2020        Lab Results   Component Value Date     (H) 11/04/2020        Lab Results   Component Value Date    LDLCALC 132 (H) 11/04/2020        Lab Results   Component Value Date    LABVLDL 22 11/04/2020      No results found for: CHOLHDLRATIO    EF:   Lab Results   Component Value Date    LVEF 18 11/30/2018       Recent Testing:    Echo: 05/07/19  Limited echo for LV function.   The left ventricular systolic function is moderately reduced with an   ejection fraction of 35-40 %.   EF by Kay's method estimated at 31%.   Moderate global hypokinesis.   Normal left ventricular diastolic filling pressure.   Mild to moderate (eccentric) mitral regurgitation.   MIld tricuspid regurgitation.   Systolic pulmonary artery pressure (SPAP) is normal and estimated at 20 mmHg   (right atrial pressure 3 mmHg).   Compared to last echo on 11/30/2018 (EF 15-20%), left ventricle systolic   function has improved. The mitral and tricuspid regurgitation have decreased   in severity. Echo 11/13/18   The left ventricular systolic function is severely reduced with an ejection   fraction of 15-20 %.   EF by Kay's method estimated at 16%.   Upper limits of normal left ventricle size.   Severe global hypokinesis. Paradoxical septal motion. No intracardiac   thrombus.   Elevated left ventricular diastolic filling pressure.   The right ventricle is normal in size with decreased function.   The left atrium is moderately dilated.   The right atrium is mildly dilated.   Color flow demonstrates eccentric jet suggesting moderate mitral   regurgitation.   Mild pulmonic regurgitation.   Moderate tricuspid regurgitation.   Systolic pulmonary artery pressure (SPAP) is normal and estimated at 29 mmHg   (right atrial pressure 8 mmHg). Cardiac cath:  12/3/18:  Normal coronary arteries   Right atrial pressure  of 4, RV pressure of 24/6, PA pressure of 24/11 with a mean of 18. Pulmonary artery wedge pressure of 11.  Cardiac output of 3.11, index of  1.94, SVR is 1903, PVR is 200.  PA saturation is 57%.  Right atrial  saturation is 61% and RV saturation 59%.  Aortic saturation 98%.     NYHA:   I  ACC/ AHA Stage:    C    Pertinent Problems:  · Non-ischemic cardiomyopathy. LVEF 18%-->47-68%  · Systolic heart failure   · Hx of polysubstance abuse  · Hx of ADD previously on adderall    Visit Diagnosis:    1. Chronic systolic heart failure ([address]    2. Nonischemic cardiomyopathy ([address]    3. Tachycardia    4. Encounter for monitoring digoxin therapy      Plan:   1. coreg to 25mg twice a day for now  2 check labs, add on lipids, TSH, digoxin - will call you lab results  3. Continue Entresto 49-51mg twice a day  4. Continue only lasix as needed  5. Spironolactone (aldactone) once a day  Repeat echo - call to schedule echo [phone]   Follow up 1 year     I appreciate the opportunity for caring for this patient.      DANIEL Shah - CNP, CNP, 10/25/2021, 1:43 PM

## 2021-10-27 ENCOUNTER — TELEPHONE (OUTPATIENT)
Dept: CARDIOLOGY CLINIC | Age: 36
End: 2021-10-27

## 2021-10-27 NOTE — TELEPHONE ENCOUNTER
Was able to reach Micheal Mathis listed on HIPAA after multiple attempts were made to relay results, no answer/ no VM. She states will relay to patient, office number provided if patient has any further questions.

## 2021-10-27 NOTE — TELEPHONE ENCOUNTER
----- Message from DANIEL Andrade CNP sent at 10/26/2021  8:57 AM EDT -----  Please notify patient results. Kidney function is stable. Electrolytes are stable. Pro-BNP (heart failure number) is improved  Digoxin level is normal.   Your cholesterol levels are up, recommend diet changes to lower your cholesterol.  Good website   [email or web address]

## 2021-11-29 RX ORDER — DIGOXIN 125 MCG
TABLET ORAL
Qty: 30 TABLET | Refills: 5 | Status: SHIPPED | OUTPATIENT
Start: 2021-11-29 | End: 2022-06-15 | Stop reason: SDUPTHER

## 2021-11-29 RX ORDER — FUROSEMIDE 20 MG/1
TABLET ORAL
Qty: 30 TABLET | Refills: 5 | Status: SHIPPED | OUTPATIENT
Start: 2021-11-29 | End: 2022-06-15 | Stop reason: SDUPTHER

## 2021-12-20 DIAGNOSIS — I50.22 CHRONIC SYSTOLIC HEART FAILURE (HCC): ICD-10-CM

## 2021-12-20 RX ORDER — SPIRONOLACTONE 25 MG/1
25 TABLET ORAL DAILY
Qty: 90 TABLET | Refills: 1 | Status: SHIPPED | OUTPATIENT
Start: 2021-12-20 | End: 2022-06-24

## 2022-06-16 RX ORDER — DIGOXIN 125 MCG
TABLET ORAL
Qty: 90 TABLET | Refills: 1 | Status: SHIPPED | OUTPATIENT
Start: 2022-06-16 | End: 2022-09-30 | Stop reason: SDUPTHER

## 2022-06-16 RX ORDER — FUROSEMIDE 20 MG/1
TABLET ORAL
Qty: 90 TABLET | Refills: 1 | Status: SHIPPED | OUTPATIENT
Start: 2022-06-16

## 2022-06-24 DIAGNOSIS — I50.22 CHRONIC SYSTOLIC HEART FAILURE (HCC): ICD-10-CM

## 2022-06-24 RX ORDER — SPIRONOLACTONE 25 MG/1
TABLET ORAL
Qty: 90 TABLET | Refills: 1 | Status: SHIPPED | OUTPATIENT
Start: 2022-06-24 | End: 2022-09-30 | Stop reason: SDUPTHER

## 2022-09-29 DIAGNOSIS — I50.22 CHRONIC SYSTOLIC HEART FAILURE (HCC): ICD-10-CM

## 2022-09-29 NOTE — TELEPHONE ENCOUNTER
Pt is requesting refills of Spironlactone and Digoxin. [facility] [address] [phone]. Last ov 10/25/2021 nprb.

## 2022-09-30 RX ORDER — SPIRONOLACTONE 25 MG/1
TABLET ORAL
Qty: 90 TABLET | Refills: 0 | Status: SHIPPED | OUTPATIENT
Start: 2022-09-30

## 2022-09-30 RX ORDER — DIGOXIN 125 MCG
TABLET ORAL
Qty: 90 TABLET | Refills: 0 | Status: SHIPPED | OUTPATIENT
Start: 2022-09-30

## 2023-02-16 ENCOUNTER — TELEPHONE (OUTPATIENT)
Dept: CARDIOLOGY CLINIC | Age: 38
End: 2023-02-16

## 2023-02-16 NOTE — TELEPHONE ENCOUNTER
Refill  digoxin (LANOXIN) 125 MCG tablet   spironolactone (ALDACTONE) 25 MG tablet   sacubitril-valsartan (ENTRESTO) 49-51 MG per tablet   furosemide (LASIX) 20 MG tablet [  Izabela Nance [id number] - [facility], [facility] [phone] - f [phone]

## 2023-02-16 NOTE — TELEPHONE ENCOUNTER
Please schedule pt first available OV with [facility] for med refills, then route back to board.  Thank you!!     Last OV [facility] 10/25/21

## 2023-02-17 NOTE — TELEPHONE ENCOUNTER
2/17 called ([phone]) unable to make contact with pt. LM for pt to call [facility] to schedule appt with [facility]. Will attempt to reach pt at a later time.

## 2023-02-20 ENCOUNTER — TELEPHONE (OUTPATIENT)
Dept: CARDIOLOGY CLINIC | Age: 38
End: 2023-02-20

## 2023-02-20 NOTE — LETTER
[address]  Phone: [phone]  Fax: [phone]    DANIEL Major CNP        February 20, 2023    Dot Seo  [address] [place] [address]      Dear Milo Espinoza: We have been unable to reach you by phone. Please call our office to schedule an appointment. Our records show that you have not been seen in our office since 10/2021. We can not continue to give you medication refills without seeing you for your cardiology care. If you have any questions or concerns, please don't hesitate to call.     Sincerely,        DANIEL Major CNP

## 2023-02-20 NOTE — TELEPHONE ENCOUNTER
Letter typed and mailed to patient. We con not refill any of her meds. She has not been seen 10/2021. She needs to come in for an appt. Also- the request from the pharmacy had the address of  [address] [place] [address]    That's the address I mailed the letter to.

## 2023-02-22 NOTE — TELEPHONE ENCOUNTER
02/22 2nd attempt, called pt @ [phone] and lvm to call [facility] and schedule annual/ med refill appt w/ nprb.

## 2023-02-24 NOTE — TELEPHONE ENCOUNTER
02/24-Called ([phone]) unable to make contact, \"u mail subscriber is currently unavailable. \" LM on VM, letter has also been sent to listed mailing address.

## 2024-09-04 RX ORDER — ESCITALOPRAM OXALATE 10 MG/1
1 TABLET ORAL DAILY
COMMUNITY
Start: 2024-08-23 | End: 2024-09-06 | Stop reason: ALTCHOICE

## 2024-09-05 ENCOUNTER — PREP FOR PROCEDURE (OUTPATIENT)
Dept: SURGERY | Age: 39
End: 2024-09-05

## 2024-09-05 ENCOUNTER — INITIAL CONSULT (OUTPATIENT)
Dept: SURGERY | Age: 39
End: 2024-09-05

## 2024-09-05 VITALS
WEIGHT: 133 LBS | BODY MASS INDEX: 23.57 KG/M2 | DIASTOLIC BLOOD PRESSURE: 70 MMHG | SYSTOLIC BLOOD PRESSURE: 110 MMHG | HEIGHT: 63 IN

## 2024-09-05 DIAGNOSIS — D17.0 LIPOMA OF NECK: Primary | ICD-10-CM

## 2024-09-05 DIAGNOSIS — D17.1 LIPOMA OF TORSO: ICD-10-CM

## 2024-09-05 DIAGNOSIS — D17.0 LIPOMA OF NECK: ICD-10-CM

## 2024-09-05 DIAGNOSIS — D17.1 LIPOMA OF BACK: ICD-10-CM

## 2024-09-05 PROCEDURE — 99202 OFFICE O/P NEW SF 15 MIN: CPT | Performed by: SURGERY

## 2024-09-05 NOTE — PROGRESS NOTES
New Patient Consult    [facility]  Marlon Cast MD    [address], [address]  [place] [address]  [phone]    Maria T Pastor   YOB: 1985    Date of Visit:  9/5/2024      Darnell Hawkins MD    Chief Complaint: Neck and back lumps    HPI: Patient presents for evaluation of lumps on her neck and back.  She states she has had these for years.  They are getting larger and more uncomfortable.  They hurt if she bumps them or leans up against them    Allergies   Allergen Reactions    Seroquel [Quetiapine Fumarate]      Too tired and groggy.     Outpatient Medications Marked as Taking for the 9/5/24 encounter (Initial consult) with Marlon Cast MD   Medication Sig Dispense Refill    escitalopram (LEXAPRO) 10 MG tablet Take 1 tablet by mouth daily      norethindrone (JENCYCLA) 0.35 MG tablet Take 1 tablet by mouth daily      Blood Pressure Monitor KIT 1 Device by Does not apply route See Admin Instructions 1 kit 0       Past Medical History:   Diagnosis Date    Anxiety 5/16/2011    Attention deficit disorder (ADD) without hyperactivity     Bipolar 2 disorder (HCC) 4/27/2011    Congestive heart failure of unknown etiology (HCC) 11/30/2018    Substance abuse (HCC)     herion     Past Surgical History:   Procedure Laterality Date    APPENDECTOMY       No family history on file.  Social History     Socioeconomic History    Marital status: Single     Spouse name: Not on file    Number of children: Not on file    Years of education: Not on file    Highest education level: Not on file   Occupational History    Not on file   Tobacco Use    Smoking status: Never    Smokeless tobacco: Never   Vaping Use    Vaping status: Never Used   Substance and Sexual Activity    Alcohol use: No    Drug use: No     Comment: Methadone, Herion 4years clean    Sexual activity: Yes   Other Topics Concern    Not on file   Social History Narrative    Not on file

## 2024-09-18 ENCOUNTER — ANESTHESIA EVENT (OUTPATIENT)
Dept: OPERATING ROOM | Age: 39
End: 2024-09-18

## 2024-09-19 ENCOUNTER — HOSPITAL ENCOUNTER (OUTPATIENT)
Age: 39
Setting detail: OUTPATIENT SURGERY
Discharge: HOME | End: 2024-09-19
Attending: SURGERY | Admitting: SURGERY

## 2024-09-19 ENCOUNTER — ANESTHESIA (OUTPATIENT)
Dept: OPERATING ROOM | Age: 39
End: 2024-09-19

## 2024-09-19 VITALS
WEIGHT: 131 LBS | BODY MASS INDEX: 23.21 KG/M2 | OXYGEN SATURATION: 100 % | TEMPERATURE: 97 F | DIASTOLIC BLOOD PRESSURE: 42 MMHG | RESPIRATION RATE: 12 BRPM | HEIGHT: 63 IN | HEART RATE: 72 BPM | SYSTOLIC BLOOD PRESSURE: 114 MMHG

## 2024-09-19 DIAGNOSIS — D17.0 LIPOMA OF NECK: ICD-10-CM

## 2024-09-19 DIAGNOSIS — D17.1 LIPOMA OF BACK: ICD-10-CM

## 2024-09-19 LAB — HCG UR QL: NEGATIVE

## 2024-09-19 PROCEDURE — 2709999900 HC NON-CHARGEABLE SUPPLY: Performed by: SURGERY

## 2024-09-19 PROCEDURE — 6360000002 HC RX W HCPCS: Performed by: NURSE ANESTHETIST, CERTIFIED REGISTERED

## 2024-09-19 PROCEDURE — 6370000000 HC RX 637 (ALT 250 FOR IP): Performed by: ANESTHESIOLOGY

## 2024-09-19 PROCEDURE — 3700000000 HC ANESTHESIA ATTENDED CARE: Performed by: SURGERY

## 2024-09-19 PROCEDURE — 21552 EXC NECK LES SC 3 CM/>: CPT | Performed by: SURGERY

## 2024-09-19 PROCEDURE — 3600000002 HC SURGERY LEVEL 2 BASE: Performed by: SURGERY

## 2024-09-19 PROCEDURE — A4217 STERILE WATER/SALINE, 500 ML: HCPCS | Performed by: SURGERY

## 2024-09-19 PROCEDURE — 2580000003 HC RX 258: Performed by: ANESTHESIOLOGY

## 2024-09-19 PROCEDURE — 6360000002 HC RX W HCPCS: Performed by: SURGERY

## 2024-09-19 PROCEDURE — 3700000001 HC ADD 15 MINUTES (ANESTHESIA): Performed by: SURGERY

## 2024-09-19 PROCEDURE — 21931 EXC BACK LES SC 3 CM/>: CPT | Performed by: SURGERY

## 2024-09-19 PROCEDURE — 7100000011 HC PHASE II RECOVERY - ADDTL 15 MIN: Performed by: SURGERY

## 2024-09-19 PROCEDURE — 7100000010 HC PHASE II RECOVERY - FIRST 15 MIN: Performed by: SURGERY

## 2024-09-19 PROCEDURE — 88304 TISSUE EXAM BY PATHOLOGIST: CPT

## 2024-09-19 PROCEDURE — 2500000003 HC RX 250 WO HCPCS: Performed by: NURSE ANESTHETIST, CERTIFIED REGISTERED

## 2024-09-19 PROCEDURE — 2500000003 HC RX 250 WO HCPCS: Performed by: SURGERY

## 2024-09-19 PROCEDURE — 3600000012 HC SURGERY LEVEL 2 ADDTL 15MIN: Performed by: SURGERY

## 2024-09-19 PROCEDURE — 2580000003 HC RX 258: Performed by: SURGERY

## 2024-09-19 PROCEDURE — 84703 CHORIONIC GONADOTROPIN ASSAY: CPT

## 2024-09-19 RX ORDER — SODIUM CHLORIDE 9 MG/ML
INJECTION, SOLUTION INTRAVENOUS PRN
Status: DISCONTINUED | OUTPATIENT
Start: 2024-09-19 | End: 2024-09-19 | Stop reason: HOSPADM

## 2024-09-19 RX ORDER — SODIUM CHLORIDE, SODIUM LACTATE, POTASSIUM CHLORIDE, CALCIUM CHLORIDE 600; 310; 30; 20 MG/100ML; MG/100ML; MG/100ML; MG/100ML
INJECTION, SOLUTION INTRAVENOUS CONTINUOUS
Status: DISCONTINUED | OUTPATIENT
Start: 2024-09-19 | End: 2024-09-19 | Stop reason: HOSPADM

## 2024-09-19 RX ORDER — SODIUM CHLORIDE 0.9 % (FLUSH) 0.9 %
5-40 SYRINGE (ML) INJECTION EVERY 12 HOURS SCHEDULED
Status: DISCONTINUED | OUTPATIENT
Start: 2024-09-19 | End: 2024-09-19 | Stop reason: HOSPADM

## 2024-09-19 RX ORDER — SODIUM CHLORIDE 0.9 % (FLUSH) 0.9 %
5-40 SYRINGE (ML) INJECTION PRN
Status: DISCONTINUED | OUTPATIENT
Start: 2024-09-19 | End: 2024-09-19 | Stop reason: HOSPADM

## 2024-09-19 RX ORDER — IBUPROFEN 600 MG/1
600 TABLET, FILM COATED ORAL 4 TIMES DAILY PRN
Qty: 20 TABLET | Refills: 0 | Status: SHIPPED | OUTPATIENT
Start: 2024-09-19 | End: 2024-09-24

## 2024-09-19 RX ORDER — MEPERIDINE HYDROCHLORIDE 25 MG/ML
12.5 INJECTION INTRAMUSCULAR; INTRAVENOUS; SUBCUTANEOUS EVERY 5 MIN PRN
Status: DISCONTINUED | OUTPATIENT
Start: 2024-09-19 | End: 2024-09-19 | Stop reason: HOSPADM

## 2024-09-19 RX ORDER — DIPHENHYDRAMINE HYDROCHLORIDE 50 MG/ML
12.5 INJECTION INTRAMUSCULAR; INTRAVENOUS
Status: DISCONTINUED | OUTPATIENT
Start: 2024-09-19 | End: 2024-09-19 | Stop reason: HOSPADM

## 2024-09-19 RX ORDER — MAGNESIUM HYDROXIDE 1200 MG/15ML
LIQUID ORAL CONTINUOUS PRN
Status: COMPLETED | OUTPATIENT
Start: 2024-09-19 | End: 2024-09-19

## 2024-09-19 RX ORDER — LIDOCAINE HYDROCHLORIDE 10 MG/ML
0.3 INJECTION, SOLUTION EPIDURAL; INFILTRATION; INTRACAUDAL; PERINEURAL
Status: DISCONTINUED | OUTPATIENT
Start: 2024-09-19 | End: 2024-09-19 | Stop reason: HOSPADM

## 2024-09-19 RX ORDER — NALOXONE HYDROCHLORIDE 0.4 MG/ML
INJECTION, SOLUTION INTRAMUSCULAR; INTRAVENOUS; SUBCUTANEOUS PRN
Status: DISCONTINUED | OUTPATIENT
Start: 2024-09-19 | End: 2024-09-19 | Stop reason: HOSPADM

## 2024-09-19 RX ORDER — MIDAZOLAM HYDROCHLORIDE 1 MG/ML
INJECTION INTRAMUSCULAR; INTRAVENOUS
Status: DISCONTINUED | OUTPATIENT
Start: 2024-09-19 | End: 2024-09-19 | Stop reason: SDUPTHER

## 2024-09-19 RX ORDER — PROPOFOL 10 MG/ML
INJECTION, EMULSION INTRAVENOUS
Status: DISCONTINUED | OUTPATIENT
Start: 2024-09-19 | End: 2024-09-19 | Stop reason: SDUPTHER

## 2024-09-19 RX ORDER — OXYCODONE HYDROCHLORIDE 5 MG/1
10 TABLET ORAL PRN
Status: COMPLETED | OUTPATIENT
Start: 2024-09-19 | End: 2024-09-19

## 2024-09-19 RX ORDER — ONDANSETRON 2 MG/ML
4 INJECTION INTRAMUSCULAR; INTRAVENOUS
Status: DISCONTINUED | OUTPATIENT
Start: 2024-09-19 | End: 2024-09-19 | Stop reason: HOSPADM

## 2024-09-19 RX ORDER — OXYCODONE HYDROCHLORIDE 5 MG/1
5 TABLET ORAL PRN
Status: COMPLETED | OUTPATIENT
Start: 2024-09-19 | End: 2024-09-19

## 2024-09-19 RX ORDER — LABETALOL HYDROCHLORIDE 5 MG/ML
5 INJECTION, SOLUTION INTRAVENOUS EVERY 10 MIN PRN
Status: DISCONTINUED | OUTPATIENT
Start: 2024-09-19 | End: 2024-09-19 | Stop reason: HOSPADM

## 2024-09-19 RX ORDER — FENTANYL CITRATE 50 UG/ML
INJECTION, SOLUTION INTRAMUSCULAR; INTRAVENOUS
Status: DISCONTINUED | OUTPATIENT
Start: 2024-09-19 | End: 2024-09-19 | Stop reason: SDUPTHER

## 2024-09-19 RX ORDER — LIDOCAINE HYDROCHLORIDE 20 MG/ML
INJECTION, SOLUTION INFILTRATION; PERINEURAL
Status: DISCONTINUED | OUTPATIENT
Start: 2024-09-19 | End: 2024-09-19 | Stop reason: SDUPTHER

## 2024-09-19 RX ADMIN — PROPOFOL 100 MG: 10 INJECTION, EMULSION INTRAVENOUS at 13:35

## 2024-09-19 RX ADMIN — PROPOFOL 50 MG: 10 INJECTION, EMULSION INTRAVENOUS at 13:44

## 2024-09-19 RX ADMIN — SODIUM CHLORIDE, POTASSIUM CHLORIDE, SODIUM LACTATE AND CALCIUM CHLORIDE: 600; 310; 30; 20 INJECTION, SOLUTION INTRAVENOUS at 10:56

## 2024-09-19 RX ADMIN — PROPOFOL 170 MG: 10 INJECTION, EMULSION INTRAVENOUS at 13:10

## 2024-09-19 RX ADMIN — MIDAZOLAM 2 MG: 1 INJECTION INTRAMUSCULAR; INTRAVENOUS at 12:55

## 2024-09-19 RX ADMIN — PROPOFOL 100 MG: 10 INJECTION, EMULSION INTRAVENOUS at 13:25

## 2024-09-19 RX ADMIN — FENTANYL CITRATE 50 MCG: 50 INJECTION INTRAMUSCULAR; INTRAVENOUS at 13:15

## 2024-09-19 RX ADMIN — FENTANYL CITRATE 50 MCG: 50 INJECTION INTRAMUSCULAR; INTRAVENOUS at 13:35

## 2024-09-19 RX ADMIN — PROPOFOL 50 MG: 10 INJECTION, EMULSION INTRAVENOUS at 13:43

## 2024-09-19 RX ADMIN — OXYCODONE HYDROCHLORIDE 5 MG: 5 TABLET ORAL at 14:36

## 2024-09-19 RX ADMIN — PROPOFOL 30 MG: 10 INJECTION, EMULSION INTRAVENOUS at 13:03

## 2024-09-19 RX ADMIN — PROPOFOL 100 MG: 10 INJECTION, EMULSION INTRAVENOUS at 13:48

## 2024-09-19 RX ADMIN — LIDOCAINE HYDROCHLORIDE 60 MG: 20 INJECTION, SOLUTION INFILTRATION; PERINEURAL at 13:03

## 2024-09-19 ASSESSMENT — PAIN DESCRIPTION - ORIENTATION: ORIENTATION: LOWER

## 2024-09-19 ASSESSMENT — PAIN DESCRIPTION - DESCRIPTORS
DESCRIPTORS: OTHER (COMMENT)
DESCRIPTORS: ACHING;SHARP

## 2024-09-19 ASSESSMENT — ENCOUNTER SYMPTOMS: SHORTNESS OF BREATH: 1

## 2024-09-19 ASSESSMENT — PAIN SCALES - GENERAL: PAINLEVEL_OUTOF10: 7

## 2024-09-19 ASSESSMENT — PAIN - FUNCTIONAL ASSESSMENT
PAIN_FUNCTIONAL_ASSESSMENT: 0-10
PAIN_FUNCTIONAL_ASSESSMENT: 0-10

## 2024-09-19 ASSESSMENT — PAIN DESCRIPTION - LOCATION: LOCATION: BACK

## 2025-01-29 ENCOUNTER — OFFICE VISIT (OUTPATIENT)
Dept: CARDIOLOGY CLINIC | Age: 40
End: 2025-01-29
Payer: COMMERCIAL

## 2025-01-29 VITALS
SYSTOLIC BLOOD PRESSURE: 92 MMHG | HEART RATE: 93 BPM | OXYGEN SATURATION: 97 % | BODY MASS INDEX: 23.12 KG/M2 | WEIGHT: 130.5 LBS | HEIGHT: 63 IN | DIASTOLIC BLOOD PRESSURE: 52 MMHG

## 2025-01-29 DIAGNOSIS — I50.22 CHRONIC SYSTOLIC CONGESTIVE HEART FAILURE (HCC): ICD-10-CM

## 2025-01-29 DIAGNOSIS — I50.9 CONGESTIVE HEART FAILURE OF UNKNOWN ETIOLOGY (HCC): ICD-10-CM

## 2025-01-29 DIAGNOSIS — I42.8 NONISCHEMIC CARDIOMYOPATHY (HCC): ICD-10-CM

## 2025-01-29 DIAGNOSIS — I50.22 CHRONIC SYSTOLIC CONGESTIVE HEART FAILURE (HCC): Primary | ICD-10-CM

## 2025-01-29 LAB
ALBUMIN SERPL-MCNC: 4.1 G/DL (ref 3.4–5)
ALBUMIN/GLOB SERPL: 1.9 {RATIO} (ref 1.1–2.2)
ALP SERPL-CCNC: 51 U/L (ref 40–129)
ALT SERPL-CCNC: 27 U/L (ref 10–40)
ANION GAP SERPL CALCULATED.3IONS-SCNC: 10 MMOL/L (ref 3–16)
AST SERPL-CCNC: 28 U/L (ref 15–37)
BILIRUB SERPL-MCNC: 0.5 MG/DL (ref 0–1)
BUN SERPL-MCNC: 10 MG/DL (ref 7–20)
CALCIUM SERPL-MCNC: 9.8 MG/DL (ref 8.3–10.6)
CHLORIDE SERPL-SCNC: 101 MMOL/L (ref 99–110)
CHOLEST SERPL-MCNC: 155 MG/DL (ref 0–199)
CO2 SERPL-SCNC: 29 MMOL/L (ref 21–32)
CREAT SERPL-MCNC: 0.7 MG/DL (ref 0.6–1.1)
DEPRECATED RDW RBC AUTO: 13 % (ref 12.4–15.4)
GFR SERPLBLD CREATININE-BSD FMLA CKD-EPI: >90 ML/MIN/{1.73_M2}
GLUCOSE SERPL-MCNC: 88 MG/DL (ref 70–99)
HCT VFR BLD AUTO: 42.9 % (ref 36–48)
HDLC SERPL-MCNC: 46 MG/DL (ref 40–60)
HGB BLD-MCNC: 15 G/DL (ref 12–16)
LDL CHOLESTEROL: 93 MG/DL
MCH RBC QN AUTO: 34.4 PG (ref 26–34)
MCHC RBC AUTO-ENTMCNC: 34.9 G/DL (ref 31–36)
MCV RBC AUTO: 98.7 FL (ref 80–100)
NT-PROBNP SERPL-MCNC: 177 PG/ML (ref 0–124)
PLATELET # BLD AUTO: 303 K/UL (ref 135–450)
PMV BLD AUTO: 8.6 FL (ref 5–10.5)
POTASSIUM SERPL-SCNC: 3.8 MMOL/L (ref 3.5–5.1)
PROT SERPL-MCNC: 6.3 G/DL (ref 6.4–8.2)
RBC # BLD AUTO: 4.34 M/UL (ref 4–5.2)
SODIUM SERPL-SCNC: 140 MMOL/L (ref 136–145)
TRIGL SERPL-MCNC: 78 MG/DL (ref 0–150)
VLDLC SERPL CALC-MCNC: 16 MG/DL
WBC # BLD AUTO: 4.9 K/UL (ref 4–11)

## 2025-01-29 PROCEDURE — 93000 ELECTROCARDIOGRAM COMPLETE: CPT | Performed by: NURSE PRACTITIONER

## 2025-01-29 PROCEDURE — 99214 OFFICE O/P EST MOD 30 MIN: CPT | Performed by: NURSE PRACTITIONER

## 2025-01-29 NOTE — PATIENT INSTRUCTIONS
Plan:   Check labs  Once labs reviewed will consider adding low dose Spironolactone (aldactone)   Limit sodium intake  Call to schedule echo [phone]   Follow up 6 weeks

## 2025-01-29 NOTE — PROGRESS NOTES
[facility]   Cardiac Follow-up    Primary Care Doctor:  Darnell Hawkins MD    Chief Complaint   Patient presents with    New Patient    Congestive Heart Failure    Edema        History of Present Illness:   I had the pleasure of seeing Maria T Pastor to re-establish care for cardiomyopathy, last seen in the office 2021.   .  Admitted from 11/30/18-12/5/18 for shortness of breath. LVEF 16% on echo. Cardiac cath was negative. Started and discharged with Toprol, lisinopril, digoxin, Lasix, and spironolactone. Stopped Adderall due to NSVT seen on EKG.   echo 5/2019 showed improved LVEF 35-40%.     Since last visit, she has been off of meds for about a year and a half.  During that time she did not have any cardiac issues.  No edema until recently started to have edema in bilateral hands at times.  She has been active.  Some days she gets up to 17,000 steps.  Denies any shortness of breath or chest pain.  No palpitations.  Has not had alcohol for over 1 month.  Denies any substance use  She has been working at a [employer] and working on her feet 8 hours a day    Maria T Maloneysedrickenies chest pain, chest pressure/discomfort, dyspnea, dyspnea on exertion, fatigue, lower extremity edema, orthopnea, palpitations.     Diet: Has been eating more pizza due to job    Past Medical History:   has a past medical history of Anxiety, Attention deficit disorder (ADD) without hyperactivity, Bipolar 2 disorder (HCC), Congestive heart failure of unknown etiology (HCC), and Substance abuse (HCC).  Surgical History:   has a past surgical history that includes Appendectomy and Neck surgery (N/A, 9/19/2024).   Social History:   reports that she has never smoked. She has never used smokeless tobacco. She reports that she does not drink alcohol and does not use drugs.   Family History:   History reviewed. No pertinent family history.    Home Medications:  Prior to Admission medications    Medication Sig Start Date End Date

## 2025-01-30 DIAGNOSIS — I50.22 CHRONIC SYSTOLIC CONGESTIVE HEART FAILURE (HCC): Primary | ICD-10-CM

## 2025-01-30 PROBLEM — R00.0 TACHYCARDIA: Status: RESOLVED | Noted: 2018-11-30 | Resolved: 2025-01-30

## 2025-01-30 PROBLEM — R06.02 SHORTNESS OF BREATH: Status: RESOLVED | Noted: 2018-11-30 | Resolved: 2025-01-30

## 2025-01-30 PROBLEM — R93.89 ABNORMAL CHEST X-RAY: Status: RESOLVED | Noted: 2018-11-30 | Resolved: 2025-01-30

## 2025-01-30 RX ORDER — SPIRONOLACTONE 25 MG/1
12.5 TABLET ORAL DAILY
Qty: 45 TABLET | Refills: 1 | Status: SHIPPED | OUTPATIENT
Start: 2025-01-30

## 2025-02-25 ENCOUNTER — HOSPITAL ENCOUNTER (OUTPATIENT)
Dept: CARDIOLOGY | Age: 40
Discharge: HOME | End: 2025-02-27
Payer: COMMERCIAL

## 2025-02-25 VITALS
SYSTOLIC BLOOD PRESSURE: 92 MMHG | BODY MASS INDEX: 23.04 KG/M2 | DIASTOLIC BLOOD PRESSURE: 52 MMHG | HEIGHT: 63 IN | WEIGHT: 130 LBS

## 2025-02-25 DIAGNOSIS — I42.8 NONISCHEMIC CARDIOMYOPATHY (HCC): ICD-10-CM

## 2025-02-25 DIAGNOSIS — I50.22 CHRONIC SYSTOLIC CONGESTIVE HEART FAILURE (HCC): ICD-10-CM

## 2025-02-25 LAB
ECHO AO ASC DIAM: 2.7 CM
ECHO AO ASCENDING AORTA INDEX: 1.68 CM/M2
ECHO AO ROOT DIAM: 2.9 CM
ECHO AO ROOT INDEX: 1.8 CM/M2
ECHO AV AREA PEAK VELOCITY: 3.4 CM2
ECHO AV AREA VTI: 3.5 CM2
ECHO AV AREA/BSA PEAK VELOCITY: 2.1 CM2/M2
ECHO AV AREA/BSA VTI: 2.2 CM2/M2
ECHO AV CUSP MM: 2 CM
ECHO AV MEAN GRADIENT: 5 MMHG
ECHO AV MEAN VELOCITY: 1 M/S
ECHO AV PEAK GRADIENT: 8 MMHG
ECHO AV PEAK VELOCITY: 1.4 M/S
ECHO AV VELOCITY RATIO: 0.79
ECHO AV VTI: 27.6 CM
ECHO BSA: 1.62 M2
ECHO EST RA PRESSURE: 8 MMHG
ECHO LA AREA 2C: 20.6 CM2
ECHO LA AREA 4C: 15.5 CM2
ECHO LA DIAMETER INDEX: 2.67 CM/M2
ECHO LA DIAMETER: 4.3 CM
ECHO LA MAJOR AXIS: 4.8 CM
ECHO LA MINOR AXIS: 5.1 CM
ECHO LA TO AORTIC ROOT RATIO: 1.48
ECHO LA VOL BP: 53 ML (ref 22–52)
ECHO LA VOL MOD A2C: 65 ML (ref 22–52)
ECHO LA VOL MOD A4C: 41 ML (ref 22–52)
ECHO LA VOL/BSA BIPLANE: 33 ML/M2 (ref 16–34)
ECHO LA VOLUME INDEX MOD A2C: 40 ML/M2 (ref 16–34)
ECHO LA VOLUME INDEX MOD A4C: 25 ML/M2 (ref 16–34)
ECHO LV E' LATERAL VELOCITY: 21.4 CM/S
ECHO LV E' SEPTAL VELOCITY: 10.9 CM/S
ECHO LV EDV A2C: 135 ML
ECHO LV EDV A4C: 107 ML
ECHO LV EDV INDEX A4C: 66 ML/M2
ECHO LV EDV NDEX A2C: 84 ML/M2
ECHO LV EF PHYSICIAN: 55 %
ECHO LV EJECTION FRACTION A2C: 41 %
ECHO LV EJECTION FRACTION A4C: 42 %
ECHO LV EJECTION FRACTION BIPLANE: 41 % (ref 55–100)
ECHO LV ESV A2C: 79 ML
ECHO LV ESV A4C: 62 ML
ECHO LV ESV INDEX A2C: 49 ML/M2
ECHO LV ESV INDEX A4C: 39 ML/M2
ECHO LV FRACTIONAL SHORTENING: 21 % (ref 28–44)
ECHO LV INTERNAL DIMENSION DIASTOLE INDEX: 3.29 CM/M2
ECHO LV INTERNAL DIMENSION DIASTOLIC: 5.3 CM (ref 3.9–5.3)
ECHO LV INTERNAL DIMENSION SYSTOLIC INDEX: 2.61 CM/M2
ECHO LV INTERNAL DIMENSION SYSTOLIC: 4.2 CM
ECHO LV ISOVOLUMETRIC RELAXATION TIME (IVRT): 69 MS
ECHO LV IVSD: 0.9 CM (ref 0.6–0.9)
ECHO LV MASS 2D: 162.1 G (ref 67–162)
ECHO LV MASS INDEX 2D: 100.7 G/M2 (ref 43–95)
ECHO LV POSTERIOR WALL DIASTOLIC: 0.8 CM (ref 0.6–0.9)
ECHO LV RELATIVE WALL THICKNESS RATIO: 0.3
ECHO LVOT AREA: 4.5 CM2
ECHO LVOT AV VTI INDEX: 0.77
ECHO LVOT DIAM: 2.4 CM
ECHO LVOT MEAN GRADIENT: 2 MMHG
ECHO LVOT PEAK GRADIENT: 5 MMHG
ECHO LVOT PEAK VELOCITY: 1.1 M/S
ECHO LVOT STROKE VOLUME INDEX: 59.8 ML/M2
ECHO LVOT SV: 96.3 ML
ECHO LVOT VTI: 21.3 CM
ECHO MV A VELOCITY: 0.69 M/S
ECHO MV AREA VTI: 4.7 CM2
ECHO MV E DECELERATION TIME (DT): 177 MS
ECHO MV E VELOCITY: 0.93 M/S
ECHO MV E/A RATIO: 1.35
ECHO MV E/E' LATERAL: 4.35
ECHO MV E/E' RATIO (AVERAGED): 6.44
ECHO MV E/E' SEPTAL: 8.53
ECHO MV LVOT VTI INDEX: 0.95
ECHO MV MAX VELOCITY: 1 M/S
ECHO MV MEAN GRADIENT: 2 MMHG
ECHO MV MEAN VELOCITY: 0.7 M/S
ECHO MV PEAK GRADIENT: 4 MMHG
ECHO MV VTI: 20.3 CM
ECHO RA AREA 4C: 15.8 CM2
ECHO RA END SYSTOLIC VOLUME APICAL 4 CHAMBER INDEX BSA: 24 ML/M2
ECHO RA VOLUME: 39 ML
ECHO RV BASAL DIMENSION: 3.3 CM
ECHO RV FREE WALL PEAK S': 13.2 CM/S
ECHO RV LONGITUDINAL DIMENSION: 8.6 CM
ECHO RV MID DIMENSION: 1.9 CM
ECHO RV TAPSE: 2.1 CM (ref 1.7–?)

## 2025-02-25 PROCEDURE — 93306 TTE W/DOPPLER COMPLETE: CPT

## 2025-03-12 ENCOUNTER — OFFICE VISIT (OUTPATIENT)
Dept: CARDIOLOGY CLINIC | Age: 40
End: 2025-03-12

## 2025-03-12 VITALS
DIASTOLIC BLOOD PRESSURE: 60 MMHG | OXYGEN SATURATION: 99 % | SYSTOLIC BLOOD PRESSURE: 90 MMHG | BODY MASS INDEX: 22.15 KG/M2 | HEART RATE: 76 BPM | HEIGHT: 63 IN | WEIGHT: 125 LBS

## 2025-03-12 DIAGNOSIS — I50.22 CHRONIC SYSTOLIC CONGESTIVE HEART FAILURE (HCC): Primary | ICD-10-CM

## 2025-03-12 DIAGNOSIS — I42.8 NONISCHEMIC CARDIOMYOPATHY (HCC): ICD-10-CM

## 2025-03-12 NOTE — PROGRESS NOTES
[facility]   Cardiac Follow-up    Primary Care Doctor:  Darnell Hawkins MD    Chief Complaint   Patient presents with    Follow-up    Congestive Heart Failure        History of Present Illness:   I had the pleasure of seeing Maria T Pastor to re-establish care for cardiomyopathy, last seen in the office 2021.   .  Admitted from 11/30/18-12/5/18 for shortness of breath. LVEF 16% on echo. Cardiac cath was negative. Started and discharged with Toprol, lisinopril, digoxin, Lasix, and spironolactone. Stopped Adderall due to NSVT seen on EKG.   echo 5/2019 showed improved LVEF 35-40%. She was lost to follow up and off of meds for 1 year.     Since last visit, echo completed 2/2025 showing normal EF.   Started back on 1 agent of GDMT, Spironolactone (aldactone).   No edema, no shortness of breath , no chest pain.  B/p 90/60's. No dizziness. No side effects of the medication  She is doing piliates and working full time.      Taking medications as prescribed: Yes  Able to afford medications: Yes    Past Medical History:   has a past medical history of Anxiety, Attention deficit disorder (ADD) without hyperactivity, Bipolar 2 disorder (HCC), Congestive heart failure of unknown etiology (HCC), and Substance abuse (HCC).  Surgical History:   has a past surgical history that includes Appendectomy and Neck surgery (N/A, 9/19/2024).   Social History:   reports that she has been smoking cigarettes. She has never used smokeless tobacco. She reports that she does not drink alcohol and does not use drugs.   Family History:   History reviewed. No pertinent family history.    Home Medications:  Prior to Admission medications    Medication Sig Start Date End Date Taking? Authorizing Provider   spironolactone (ALDACTONE) 25 MG tablet Take 0.5 tablets by mouth daily 1/30/25  Yes Wanda Cummins, APRN - CNP   Norethindrone (MARIA T PO) Take by mouth daily   Yes Provider, MD Bertrand   Blood Pressure Monitor KIT 1 Device

## 2025-03-12 NOTE — PATIENT INSTRUCTIONS
Plan:   Stay as active as possible  Continue Spironolactone (aldactone) 12.5mg   Check labs in the next month  Follow up in 4 months or sooner if needed

## (undated) DEVICE — GOWN SIRUS NONREIN XL W/TWL: Brand: MEDLINE INDUSTRIES, INC.

## (undated) DEVICE — MASTISOL ADHESIVE LIQ 2/3ML

## (undated) DEVICE — TIP SUCT DIA12FR W STYL CTRL VENT DISPOSABLE FRAZ

## (undated) DEVICE — APPLICATOR PREP 6ML 0.7% IOD POVACRYLEX 74% ISO ALC

## (undated) DEVICE — GLOVE ORANGE PI 7 1/2   MSG9075

## (undated) DEVICE — Z INACTIVE USE 2635508 SOLUTION IRRIG 500ML 0.9% SOD CHL USP POUR PLAS BTL

## (undated) DEVICE — MHCZ MINOR: Brand: MEDLINE INDUSTRIES, INC.

## (undated) DEVICE — CANNULA NSL 13FT TUBE AD ETCO2 DIV SAMP M

## (undated) DEVICE — SUTURE VICRYL SZ 4-0 L18IN ABSRB UD L19MM PS-2 3/8 CIR PRIM J496H

## (undated) DEVICE — SUTURE VICRYL SZ 3-0 L18IN ABSRB UD L26MM SH 1/2 CIR J864D